# Patient Record
Sex: MALE | Race: WHITE | NOT HISPANIC OR LATINO | Employment: OTHER | RURAL
[De-identification: names, ages, dates, MRNs, and addresses within clinical notes are randomized per-mention and may not be internally consistent; named-entity substitution may affect disease eponyms.]

---

## 2017-04-05 ENCOUNTER — CONVERSION ENCOUNTER (OUTPATIENT)
Dept: FAMILY MEDICINE CLINIC | Facility: CLINIC | Age: 72
End: 2017-04-05

## 2017-06-08 ENCOUNTER — CONVERSION ENCOUNTER (OUTPATIENT)
Dept: FAMILY MEDICINE CLINIC | Facility: CLINIC | Age: 72
End: 2017-06-08

## 2018-09-06 ENCOUNTER — ON CAMPUS - OUTPATIENT (AMBULATORY)
Dept: URBAN - METROPOLITAN AREA HOSPITAL 2 | Facility: HOSPITAL | Age: 73
End: 2018-09-06

## 2018-09-06 VITALS
WEIGHT: 160.4 LBS | OXYGEN SATURATION: 95 % | TEMPERATURE: 97 F | HEART RATE: 73 BPM | DIASTOLIC BLOOD PRESSURE: 34 MMHG | SYSTOLIC BLOOD PRESSURE: 143 MMHG | OXYGEN SATURATION: 94 % | SYSTOLIC BLOOD PRESSURE: 81 MMHG | RESPIRATION RATE: 17 BRPM | SYSTOLIC BLOOD PRESSURE: 83 MMHG | HEART RATE: 69 BPM | HEART RATE: 65 BPM | OXYGEN SATURATION: 99 % | DIASTOLIC BLOOD PRESSURE: 82 MMHG | OXYGEN SATURATION: 97 % | SYSTOLIC BLOOD PRESSURE: 89 MMHG | DIASTOLIC BLOOD PRESSURE: 60 MMHG | DIASTOLIC BLOOD PRESSURE: 59 MMHG | DIASTOLIC BLOOD PRESSURE: 40 MMHG | SYSTOLIC BLOOD PRESSURE: 95 MMHG | OXYGEN SATURATION: 98 % | HEART RATE: 68 BPM | DIASTOLIC BLOOD PRESSURE: 58 MMHG | HEIGHT: 69 IN | SYSTOLIC BLOOD PRESSURE: 76 MMHG | RESPIRATION RATE: 16 BRPM | SYSTOLIC BLOOD PRESSURE: 127 MMHG | HEART RATE: 64 BPM

## 2018-09-06 DIAGNOSIS — K57.30 DIVERTICULOSIS OF LARGE INTESTINE WITHOUT PERFORATION OR ABS: ICD-10-CM

## 2018-09-06 DIAGNOSIS — Z86.010 PERSONAL HISTORY OF COLONIC POLYPS: ICD-10-CM

## 2018-09-06 DIAGNOSIS — K64.8 OTHER HEMORRHOIDS: ICD-10-CM

## 2018-09-06 PROCEDURE — 45378 DIAGNOSTIC COLONOSCOPY: CPT | Mod: PT | Performed by: INTERNAL MEDICINE

## 2018-11-12 ENCOUNTER — CONVERSION ENCOUNTER (OUTPATIENT)
Dept: FAMILY MEDICINE CLINIC | Facility: CLINIC | Age: 73
End: 2018-11-12

## 2019-06-04 VITALS
RESPIRATION RATE: 16 BRPM | SYSTOLIC BLOOD PRESSURE: 174 MMHG | HEART RATE: 69 BPM | RESPIRATION RATE: 18 BRPM | BODY MASS INDEX: 23.82 KG/M2 | DIASTOLIC BLOOD PRESSURE: 87 MMHG | OXYGEN SATURATION: 100 % | WEIGHT: 166 LBS | HEIGHT: 70 IN | HEART RATE: 71 BPM | SYSTOLIC BLOOD PRESSURE: 144 MMHG | WEIGHT: 166 LBS | DIASTOLIC BLOOD PRESSURE: 94 MMHG | SYSTOLIC BLOOD PRESSURE: 161 MMHG | WEIGHT: 166.4 LBS | DIASTOLIC BLOOD PRESSURE: 93 MMHG | HEART RATE: 77 BPM | BODY MASS INDEX: 24.16 KG/M2 | OXYGEN SATURATION: 97 % | BODY MASS INDEX: 24.16 KG/M2

## 2019-07-26 RX ORDER — SIMVASTATIN 40 MG
TABLET ORAL
Qty: 90 TABLET | Refills: 0 | Status: SHIPPED | OUTPATIENT
Start: 2019-07-26 | End: 2019-11-18 | Stop reason: SDUPTHER

## 2019-11-13 ENCOUNTER — TELEPHONE (OUTPATIENT)
Dept: FAMILY MEDICINE CLINIC | Facility: CLINIC | Age: 74
End: 2019-11-13

## 2019-11-13 NOTE — TELEPHONE ENCOUNTER
Proctosol HC 2.5% rectal cream is not covered by insurance. They are requesting a change to Proctosone cream hc, Proctomed  Cream hc,  Or Hydrocortison cream. Please advise.

## 2019-11-14 ENCOUNTER — OFFICE VISIT (OUTPATIENT)
Dept: PODIATRY | Facility: CLINIC | Age: 74
End: 2019-11-14

## 2019-11-14 VITALS
SYSTOLIC BLOOD PRESSURE: 135 MMHG | WEIGHT: 165.6 LBS | DIASTOLIC BLOOD PRESSURE: 77 MMHG | HEART RATE: 80 BPM | HEIGHT: 70 IN | BODY MASS INDEX: 23.71 KG/M2

## 2019-11-14 DIAGNOSIS — L60.3 ONYCHODYSTROPHY: Primary | ICD-10-CM

## 2019-11-14 PROBLEM — M76.61 ACHILLES TENDINITIS OF RIGHT LOWER EXTREMITY: Status: ACTIVE | Noted: 2017-04-05

## 2019-11-14 PROBLEM — M75.21 BICEPS TENDINITIS, RIGHT: Status: ACTIVE | Noted: 2017-04-05

## 2019-11-14 PROBLEM — M77.42 METATARSALGIA OF LEFT FOOT: Status: ACTIVE | Noted: 2017-06-08

## 2019-11-14 PROBLEM — E78.5 HYPERLIPIDEMIA: Status: ACTIVE | Noted: 2019-11-14

## 2019-11-14 PROBLEM — J30.9 ALLERGIC RHINITIS: Status: ACTIVE | Noted: 2019-11-14

## 2019-11-14 PROBLEM — F98.8 ATTENTION DEFICIT DISORDER (ADD) WITHOUT HYPERACTIVITY: Status: ACTIVE | Noted: 2019-11-14

## 2019-11-14 PROBLEM — M19.90 OSTEOARTHRITIS: Status: ACTIVE | Noted: 2019-11-14

## 2019-11-14 PROBLEM — J01.90 SINUSITIS, ACUTE: Status: ACTIVE | Noted: 2018-11-12

## 2019-11-14 PROCEDURE — 99213 OFFICE O/P EST LOW 20 MIN: CPT | Performed by: PODIATRIST

## 2019-11-14 RX ORDER — CHOLECALCIFEROL (VITAMIN D3) 125 MCG
5 CAPSULE ORAL NIGHTLY
COMMUNITY
Start: 2014-09-17

## 2019-11-14 RX ORDER — MAGNESIUM HYDROXIDE 400 MG/5ML
SUSPENSION, ORAL (FINAL DOSE FORM) ORAL
COMMUNITY
Start: 2014-09-17

## 2019-11-14 NOTE — PROGRESS NOTES
"11/14/2019  Foot and Ankle Surgery - Established Patient/Follow-up  Provider: Dr. Kirk Israel DPM  Location: [facility]    Subjective:  Beck Sears is a 74 y.o. male.     Chief Complaint   Patient presents with   • Left Foot - Nail Problem       HPI: Patient returns with new issue involving his left great toenail.  He has noticed long-standing discoloration and thickness.  He states that he did have an injury several years ago and has had progressive issues of nail dystrophy.  Most recently, he noticed mild redness and discomfort without known injury.  He states that he did perform Epson salt soaks and symptoms gradually improved.  He feels that this was approximately 1 month ago.  He has not had any symptoms recently and is asymptomatic today.  He has tried over-the-counter topicals for this issue without improvement.  No other issues today    Allergies   Allergen Reactions   • Bee Venom Anaphylaxis   • Penicillins GI Intolerance   • Sulfa Antibiotics Hives       Current Outpatient Medications on File Prior to Visit   Medication Sig Dispense Refill   • aspirin 81 MG tablet ASPIRIN 81 MG ORAL TABLET     • Glucosamine-Chondroit-Vit C-Mn (GLUCOSAMINE CHONDR 500 COMPLEX) capsule GLUCOSAMINE CHONDR 500 COMPLEX CAPS     • hydrocortisone (PROCTOZONE-HC) 2.5 % rectal cream Insert  into the rectum 2 (Two) Times a Day. 28.35 g 5   • melatonin ([facility] MELATONIN) 5 MG tablet tablet [facility] MELATONIN 5 MG TABS     • Multiple Vitamins-Minerals (MENS MULTI VITAMIN & MINERAL) tablet MENS MULTI VITAMIN & MINERAL TABS     • simvastatin (ZOCOR) 40 MG tablet TAKE 1 TABLET BY MOUTH DAILY 90 tablet 0     No current facility-administered medications on file prior to visit.        Objective   /77   Pulse 80   Ht 176.5 cm (69.5\")   Wt 75.1 kg (165 lb 9.6 oz)   BMI 24.10 kg/m²     Podiatry Exam       General Appearance:  well nourished; well hydrated; no acute distress  Mental Status Exam        Judgement and " Insight:  Intact       Orientation:  Oriented to time, place, and person  Cardiovascular (Left)       Dorsalis Pedis Pulse (Lt):   2/4       Posterior Tibialis Pulse (Lt):   2/4       Capillary Filling Time (Lt):  1-3 Seconds       Edema (Lt):  No Edema  Dermatological Exam       Temperature:  warm to warm       Skin Elasticity:  normal skin elasticity  Nails: Thickened, discolored, and dystrophic left hallux nail with longitudinal ridge.  No pain or signs of infection.  Other nails are normal in appearance  Neurological Exam (Left)       Paresthesia (Lt):  negative       Patella DTRs (Lt):  symmetric       Achilles DTRs (Lt):  symmetric       Tinel over Tarsal Tunnel(Lt):  negative  Monofilament Test (Lt):   intact        MusculoSkeletal Exam (Left)       Gait and Stance (Lt):  early heel to toe       ROM (Lt):   ankle and pedal joint range of motion is supple, nontender, and crepitus free       Muscle Strength (Lt):  symmetrical 5/5       Subluxed Digits (Lt):  no subluxation or laxity of joints       Dislocated Joints (Lt):  no dislocation of joints       Inflammed Joints (Lt):  no inflammation of joints       Medial Turbicle Calc (Lt):  no pain       Gastroc soleus equinus (Lt):  Inability to dorsiflex past neutral position both straight legged and bent knee       Post tibial tendon (Lt):  no soreness noted       Peroneal Tendon (Lt):  no soreness noted  Additional Musculoskelatal Findings  No obvious signs of trauma to the forefoot.  No tenderness with palpation to the metatarsophalangeal joint regions.    Assessment/Plan   Beck was seen today for nail problem.    Diagnoses and all orders for this visit:    Onychodystrophy      Patient presents with dystrophic changes involving the left great toenail.  He did have symptoms of inflammation approximately 1 month ago which have resolved.  Today, he is asymptomatic.  We did discuss treatment options.  I do feel that a majority of his changes are secondary to  previous injury.  I do not feel that oral or topical antifungals would be appropriate.  We did discuss nail removal with and without chemical matrixectomy.  He does not want to consider at this time.  We did discuss proper routine nail care and conservative treatments.  I have asked that he monitor closely and call with any additional issues or concerns.  I will see him as needed.    No orders of the defined types were placed in this encounter.         Note is dictated utilizing voice recognition software. Unfortunately this leads to occasional typographical errors. I apologize in advance if the situation occurs. If questions occur please do not hesitate to call our office.

## 2019-11-14 NOTE — PATIENT INSTRUCTIONS
Fungal Nail Infection  A fungal nail infection is a common infection of the toenails or fingernails. This condition affects toenails more often than fingernails. It often affects the great, or big, toes. More than one nail may be infected. The condition can be passed from person to person (is contagious).  What are the causes?  This condition is caused by a fungus. Several types of fungi can cause the infection. These fungi are common in moist and warm areas. If your hands or feet come into contact with the fungus, it may get into a crack in your fingernail or toenail and cause the infection.  What increases the risk?  The following factors may make you more likely to develop this condition:  · Being male.  · Being of older age.  · Living with someone who has the fungus.  · Walking barefoot in areas where the fungus thrives, such as showers or locker rooms.  · Wearing shoes and socks that cause your feet to sweat.  · Having a nail injury or a recent nail surgery.  · Having certain medical conditions, such as:  ? Athlete's foot.  ? Diabetes.  ? Psoriasis.  ? Poor circulation.  ? A weak body defense system (immune system).  What are the signs or symptoms?  Symptoms of this condition include:  · A pale spot on the nail.  · Thickening of the nail.  · A nail that becomes yellow or brown.  · A brittle or ragged nail edge.  · A crumbling nail.  · A nail that has lifted away from the nail bed.  How is this diagnosed?  This condition is diagnosed with a physical exam. Your health care provider may take a scraping or clipping from your nail to test for the fungus.  How is this treated?  Treatment is not needed for mild infections. If you have significant nail changes, treatment may include:  · Antifungal medicines taken by mouth (orally). You may need to take the medicine for several weeks or several months, and you may not see the results for a long time. These medicines can cause side effects. Ask your health care provider  what problems to watch for.  · Antifungal nail polish or nail cream. These may be used along with oral antifungal medicines.  · Laser treatment of the nail.  · Surgery to remove the nail. This may be needed for the most severe infections.  It can take a long time, usually up to a year, for the infection to go away. The infection may also come back.  Follow these instructions at home:  Medicines  · Take or apply over-the-counter and prescription medicines only as told by your health care provider.  · Ask your health care provider about using over-the-counter mentholated ointment on your nails.  Nail care  · Trim your nails often.  · Wash and dry your hands and feet every day.  · Keep your feet dry:  ? Wear absorbent socks, and change your socks frequently.  ? Wear shoes that allow air to circulate, such as sandals or canvas tennis shoes. Throw out old shoes.  · Do not use artificial nails.  · If you go to a nail salon, make sure you choose one that uses clean instruments.  · Use antifungal foot powder on your feet and in your shoes.  General instructions  · Do not share personal items, such as towels or nail clippers.  · Do not walk barefoot in shower rooms or locker rooms.  · Wear rubber gloves if you are working with your hands in wet areas.  · Keep all follow-up visits as told by your health care provider. This is important.  Contact a health care provider if:  Your infection is not getting better or it is getting worse after several months.  Summary  · A fungal nail infection is a common infection of the toenails or fingernails.  · Treatment is not needed for mild infections. If you have significant nail changes, treatment may include taking medicine orally and applying medicine to your nails.  · It can take a long time, usually up to a year, for the infection to go away. The infection may also come back.  · Take or apply over-the-counter and prescription medicines only as told by your health care  provider.  · Follow instructions for taking care of your nails to help prevent infection from coming back or spreading.  This information is not intended to replace advice given to you by your health care provider. Make sure you discuss any questions you have with your health care provider.  Document Released: 12/15/2001 Document Revised: 05/24/2019 Document Reviewed: 05/24/2019  [employer] Interactive Patient Education © 2019 [employer]

## 2019-11-18 RX ORDER — SIMVASTATIN 40 MG
TABLET ORAL
Qty: 90 TABLET | Refills: 0 | Status: SHIPPED | OUTPATIENT
Start: 2019-11-18 | End: 2020-02-07

## 2019-12-09 ENCOUNTER — TELEPHONE (OUTPATIENT)
Dept: FAMILY MEDICINE CLINIC | Facility: CLINIC | Age: 74
End: 2019-12-09

## 2020-02-07 RX ORDER — SIMVASTATIN 40 MG
TABLET ORAL
Qty: 90 TABLET | Refills: 0 | Status: SHIPPED | OUTPATIENT
Start: 2020-02-07 | End: 2020-05-10

## 2020-05-10 RX ORDER — SIMVASTATIN 40 MG
TABLET ORAL
Qty: 90 TABLET | Refills: 0 | Status: SHIPPED | OUTPATIENT
Start: 2020-05-10 | End: 2020-08-06 | Stop reason: SDUPTHER

## 2020-08-05 RX ORDER — SIMVASTATIN 40 MG
TABLET ORAL
Qty: 90 TABLET | Refills: 0 | OUTPATIENT
Start: 2020-08-05

## 2020-08-06 DIAGNOSIS — E78.5 HYPERLIPIDEMIA, UNSPECIFIED HYPERLIPIDEMIA TYPE: Primary | ICD-10-CM

## 2020-08-06 DIAGNOSIS — Z12.5 ENCOUNTER FOR SCREENING FOR MALIGNANT NEOPLASM OF PROSTATE: ICD-10-CM

## 2020-08-06 RX ORDER — SIMVASTATIN 40 MG
40 TABLET ORAL DAILY
Qty: 30 TABLET | Refills: 0 | Status: SHIPPED | OUTPATIENT
Start: 2020-08-06 | End: 2020-09-08

## 2020-08-06 RX ORDER — SIMVASTATIN 40 MG
40 TABLET ORAL DAILY
Qty: 30 TABLET | Refills: 0 | Status: SHIPPED | OUTPATIENT
Start: 2020-08-06 | End: 2020-08-06

## 2020-08-06 NOTE — TELEPHONE ENCOUNTER
Patient has appt 8/27 he is wanting to get labs done before. He will be out of med. Before then please send in to  [facility]  And call him on when to get labs  [phone]

## 2020-08-14 LAB
ALBUMIN SERPL-MCNC: 4.3 G/DL (ref 3.7–4.7)
ALBUMIN/GLOB SERPL: 2.2 {RATIO} (ref 1.2–2.2)
ALP SERPL-CCNC: 56 IU/L (ref 39–117)
ALT SERPL-CCNC: 20 IU/L (ref 0–44)
AST SERPL-CCNC: 20 IU/L (ref 0–40)
BILIRUB SERPL-MCNC: 0.5 MG/DL (ref 0–1.2)
BUN SERPL-MCNC: 19 MG/DL (ref 8–27)
BUN/CREAT SERPL: 18 (ref 10–24)
CALCIUM SERPL-MCNC: 9.5 MG/DL (ref 8.6–10.2)
CHLORIDE SERPL-SCNC: 103 MMOL/L (ref 96–106)
CHOLEST SERPL-MCNC: 179 MG/DL (ref 100–199)
CO2 SERPL-SCNC: 29 MMOL/L (ref 20–29)
CREAT SERPL-MCNC: 1.07 MG/DL (ref 0.76–1.27)
GLOBULIN SER CALC-MCNC: 2 G/DL (ref 1.5–4.5)
GLUCOSE SERPL-MCNC: 97 MG/DL (ref 65–99)
HDLC SERPL-MCNC: 61 MG/DL
LDLC SERPL CALC-MCNC: 86 MG/DL (ref 0–99)
POTASSIUM SERPL-SCNC: 4.6 MMOL/L (ref 3.5–5.2)
PROT SERPL-MCNC: 6.3 G/DL (ref 6–8.5)
SODIUM SERPL-SCNC: 140 MMOL/L (ref 134–144)
TRIGL SERPL-MCNC: 158 MG/DL (ref 0–149)
VLDLC SERPL CALC-MCNC: 32 MG/DL (ref 5–40)

## 2020-08-27 ENCOUNTER — OFFICE VISIT (OUTPATIENT)
Dept: FAMILY MEDICINE CLINIC | Facility: CLINIC | Age: 75
End: 2020-08-27

## 2020-08-27 VITALS
OXYGEN SATURATION: 97 % | WEIGHT: 164 LBS | SYSTOLIC BLOOD PRESSURE: 134 MMHG | HEART RATE: 78 BPM | TEMPERATURE: 97.7 F | HEIGHT: 70 IN | BODY MASS INDEX: 23.48 KG/M2 | RESPIRATION RATE: 16 BRPM | DIASTOLIC BLOOD PRESSURE: 70 MMHG

## 2020-08-27 DIAGNOSIS — E78.2 MIXED HYPERLIPIDEMIA: Chronic | ICD-10-CM

## 2020-08-27 DIAGNOSIS — R35.1 BENIGN PROSTATIC HYPERPLASIA WITH NOCTURIA: Primary | ICD-10-CM

## 2020-08-27 DIAGNOSIS — N40.1 BENIGN PROSTATIC HYPERPLASIA WITH NOCTURIA: Primary | ICD-10-CM

## 2020-08-27 DIAGNOSIS — J30.1 SEASONAL ALLERGIC RHINITIS DUE TO POLLEN: Chronic | ICD-10-CM

## 2020-08-27 PROCEDURE — 99213 OFFICE O/P EST LOW 20 MIN: CPT | Performed by: FAMILY MEDICINE

## 2020-08-27 RX ORDER — TRIAMCINOLONE ACETONIDE 1 MG/G
CREAM TOPICAL
COMMUNITY
Start: 2020-08-11

## 2020-08-27 RX ORDER — TAMSULOSIN HYDROCHLORIDE 0.4 MG/1
1 CAPSULE ORAL DAILY
Qty: 90 CAPSULE | Refills: 3 | Status: SHIPPED | OUTPATIENT
Start: 2020-08-27 | End: 2021-07-15 | Stop reason: SDUPTHER

## 2020-08-27 NOTE — PROGRESS NOTES
"Patient presents for follow-up of hyperlipidemia and refill of medications.  He is stable and not having any adverse effects to medications.  Specifically he denies muscle aches or nausea.  He is also complaining of increasing nocturia.  He had tried an alpha-blocker in the past but did not like the way it made him feel.  However, his symptoms are worsening and he would like to restart.    Past Medical History:   Diagnosis Date   • Hyperlipidemia 11/14/2019     Past Surgical History:   Procedure Laterality Date   • BACK SURGERY     • CATARACT EXTRACTION       Family History   Problem Relation Age of Onset   • Cancer Mother    • Cancer Father    • Heart disease Father    • Hypertension Father      Social History     Tobacco Use   • Smoking status: Never Smoker   • Smokeless tobacco: Never Used   Substance Use Topics   • Alcohol use: No     Frequency: Never       PHQ-2 Depression Screening  0  PHQ-9 Depression Screening  0    Review of Systems   Constitutional: Negative for chills and fatigue.   Respiratory: Negative for cough and shortness of breath.    Cardiovascular: Negative for chest pain and palpitations.   Genitourinary: Positive for nocturia.   Musculoskeletal: Negative for arthralgias, back pain and myalgias.   Skin: Negative for rash.   Neurological: Negative for dizziness and headache.     Vitals:    08/27/20 1530 08/27/20 1551   BP: 167/85 134/70   BP Location: Left arm    Patient Position: Sitting    Cuff Size: Adult    Pulse: 78    Resp: 16    Temp: 97.7 °F (36.5 °C)    TempSrc: Temporal    SpO2: 97%    Weight: 74.4 kg (164 lb)    Height: 176.5 cm (69.5\")      Body mass index is 23.87 kg/m².  Physical Exam   Constitutional: He is oriented to person, place, and time. He appears well-developed and well-nourished. No distress.   Cardiovascular: Normal rate, regular rhythm and normal heart sounds.   No murmur heard.  Pulmonary/Chest: Effort normal and breath sounds normal. He has no wheezes. He has no rales. "   Abdominal: Soft. Bowel sounds are normal. He exhibits no distension.   Musculoskeletal: Normal range of motion.   Neurological: He is alert and oriented to person, place, and time.   Skin: Skin is warm and dry.   Psychiatric: He has a normal mood and affect. His behavior is normal.     No visits with results within 7 Day(s) from this visit.   Latest known visit with results is:   Refill on 08/06/2020   Component Date Value Ref Range Status   • Glucose 08/13/2020 97  65 - 99 mg/dL Final   • BUN 08/13/2020 19  8 - 27 mg/dL Final   • Creatinine 08/13/2020 1.07  0.76 - 1.27 mg/dL Final   • eGFR Non  Am 08/13/2020 68  >59 mL/min/1.73 Final   • eGFR African Am 08/13/2020 79  >59 mL/min/1.73 Final   • BUN/Creatinine Ratio 08/13/2020 18  10 - 24 Final   • Sodium 08/13/2020 140  134 - 144 mmol/L Final   • Potassium 08/13/2020 4.6  3.5 - 5.2 mmol/L Final   • Chloride 08/13/2020 103  96 - 106 mmol/L Final   • Total CO2 08/13/2020 29  20 - 29 mmol/L Final   • Calcium 08/13/2020 9.5  8.6 - 10.2 mg/dL Final   • Total Protein 08/13/2020 6.3  6.0 - 8.5 g/dL Final   • Albumin 08/13/2020 4.3  3.7 - 4.7 g/dL Final   • Globulin 08/13/2020 2.0  1.5 - 4.5 g/dL Final   • A/G Ratio 08/13/2020 2.2  1.2 - 2.2 Final   • Total Bilirubin 08/13/2020 0.5  0.0 - 1.2 mg/dL Final   • Alkaline Phosphatase 08/13/2020 56  39 - 117 IU/L Final   • AST (SGOT) 08/13/2020 20  0 - 40 IU/L Final   • ALT (SGPT) 08/13/2020 20  0 - 44 IU/L Final   • Total Cholesterol 08/13/2020 179  100 - 199 mg/dL Final   • Triglycerides 08/13/2020 158* 0 - 149 mg/dL Final   • HDL Cholesterol 08/13/2020 61  >39 mg/dL Final   • VLDL Cholesterol 08/13/2020 32  5 - 40 mg/dL Final   • LDL Cholesterol  08/13/2020 86  0 - 99 mg/dL Final       Diagnoses and all orders for this visit:    1. Benign prostatic hyperplasia with nocturia (Primary)  Assessment & Plan:  Restart tamsulosin.      2. Mixed hyperlipidemia  Assessment & Plan:  Recheck blood work and call with results.         3. Seasonal allergic rhinitis due to pollen  Assessment & Plan:  Seasonal. Controlled with over-the-counter medications.       Other orders  -     tamsulosin (FLOMAX) 0.4 MG capsule 24 hr capsule; Take 1 capsule by mouth Daily.  Dispense: 90 capsule; Refill: 3

## 2020-08-28 PROBLEM — J30.9 ALLERGIC RHINITIS: Chronic | Status: ACTIVE | Noted: 2019-11-14

## 2020-08-28 PROBLEM — E78.5 HYPERLIPIDEMIA: Chronic | Status: ACTIVE | Noted: 2019-11-14

## 2020-08-28 PROBLEM — M19.90 OSTEOARTHRITIS: Chronic | Status: ACTIVE | Noted: 2019-11-14

## 2020-08-28 PROBLEM — J01.90 SINUSITIS, ACUTE: Status: RESOLVED | Noted: 2018-11-12 | Resolved: 2020-08-28

## 2020-09-08 RX ORDER — SIMVASTATIN 40 MG
40 TABLET ORAL DAILY
Qty: 90 TABLET | Refills: 1 | Status: SHIPPED | OUTPATIENT
Start: 2020-09-08 | End: 2021-03-03

## 2020-10-05 ENCOUNTER — TELEPHONE (OUTPATIENT)
Dept: FAMILY MEDICINE CLINIC | Facility: CLINIC | Age: 75
End: 2020-10-05

## 2021-02-05 RX ORDER — HYDROCORTISONE 25 MG/G
CREAM TOPICAL
Qty: 30 G | Refills: 1 | Status: SHIPPED | OUTPATIENT
Start: 2021-02-05 | End: 2021-10-19 | Stop reason: SDUPTHER

## 2021-03-03 RX ORDER — SIMVASTATIN 40 MG
40 TABLET ORAL DAILY
Qty: 90 TABLET | Refills: 1 | Status: SHIPPED | OUTPATIENT
Start: 2021-03-03 | End: 2021-06-10

## 2021-06-10 RX ORDER — SIMVASTATIN 40 MG
40 TABLET ORAL DAILY
Qty: 90 TABLET | Refills: 1 | Status: SHIPPED | OUTPATIENT
Start: 2021-06-10 | End: 2022-03-22

## 2021-06-16 ENCOUNTER — HOSPITAL ENCOUNTER (EMERGENCY)
Facility: HOSPITAL | Age: 76
Discharge: HOME | End: 2021-06-16
Admitting: EMERGENCY MEDICINE

## 2021-06-16 ENCOUNTER — APPOINTMENT (OUTPATIENT)
Dept: CT IMAGING | Facility: HOSPITAL | Age: 76
End: 2021-06-16

## 2021-06-16 ENCOUNTER — APPOINTMENT (OUTPATIENT)
Dept: MRI IMAGING | Facility: HOSPITAL | Age: 76
End: 2021-06-16

## 2021-06-16 VITALS
SYSTOLIC BLOOD PRESSURE: 134 MMHG | DIASTOLIC BLOOD PRESSURE: 79 MMHG | OXYGEN SATURATION: 96 % | HEART RATE: 62 BPM | TEMPERATURE: 97.6 F | HEIGHT: 69 IN | WEIGHT: 162.7 LBS | BODY MASS INDEX: 24.1 KG/M2 | RESPIRATION RATE: 16 BRPM

## 2021-06-16 DIAGNOSIS — G89.29 CHRONIC PAIN OF RIGHT LOWER EXTREMITY: ICD-10-CM

## 2021-06-16 DIAGNOSIS — R29.898 WEAKNESS OF RIGHT LOWER EXTREMITY: Primary | ICD-10-CM

## 2021-06-16 DIAGNOSIS — M79.604 CHRONIC PAIN OF RIGHT LOWER EXTREMITY: ICD-10-CM

## 2021-06-16 LAB
ALBUMIN SERPL-MCNC: 4 G/DL (ref 3.5–5.2)
ALBUMIN/GLOB SERPL: 1.6 G/DL
ALP SERPL-CCNC: 58 U/L (ref 39–117)
ALT SERPL W P-5'-P-CCNC: 21 U/L (ref 1–41)
ANION GAP SERPL CALCULATED.3IONS-SCNC: 9 MMOL/L (ref 5–15)
AST SERPL-CCNC: 20 U/L (ref 1–40)
BASOPHILS # BLD AUTO: 0.1 10*3/MM3 (ref 0–0.2)
BASOPHILS NFR BLD AUTO: 0.7 % (ref 0–1.5)
BILIRUB SERPL-MCNC: 0.4 MG/DL (ref 0–1.2)
BUN SERPL-MCNC: 21 MG/DL (ref 8–23)
BUN/CREAT SERPL: 21 (ref 7–25)
CALCIUM SPEC-SCNC: 9.4 MG/DL (ref 8.6–10.5)
CHLORIDE SERPL-SCNC: 106 MMOL/L (ref 98–107)
CO2 SERPL-SCNC: 25 MMOL/L (ref 22–29)
CREAT SERPL-MCNC: 1 MG/DL (ref 0.76–1.27)
DEPRECATED RDW RBC AUTO: 44.6 FL (ref 37–54)
EOSINOPHIL # BLD AUTO: 0.3 10*3/MM3 (ref 0–0.4)
EOSINOPHIL NFR BLD AUTO: 3.7 % (ref 0.3–6.2)
ERYTHROCYTE [DISTWIDTH] IN BLOOD BY AUTOMATED COUNT: 15.3 % (ref 12.3–15.4)
GFR SERPL CREATININE-BSD FRML MDRD: 73 ML/MIN/1.73
GLOBULIN UR ELPH-MCNC: 2.5 GM/DL
GLUCOSE SERPL-MCNC: 107 MG/DL (ref 65–99)
HCT VFR BLD AUTO: 43.7 % (ref 37.5–51)
HGB BLD-MCNC: 14.8 G/DL (ref 13–17.7)
LYMPHOCYTES # BLD AUTO: 2.2 10*3/MM3 (ref 0.7–3.1)
LYMPHOCYTES NFR BLD AUTO: 29.6 % (ref 19.6–45.3)
MCH RBC QN AUTO: 28.7 PG (ref 26.6–33)
MCHC RBC AUTO-ENTMCNC: 33.9 G/DL (ref 31.5–35.7)
MCV RBC AUTO: 84.8 FL (ref 79–97)
MONOCYTES # BLD AUTO: 0.8 10*3/MM3 (ref 0.1–0.9)
MONOCYTES NFR BLD AUTO: 11.3 % (ref 5–12)
NEUTROPHILS NFR BLD AUTO: 4.1 10*3/MM3 (ref 1.7–7)
NEUTROPHILS NFR BLD AUTO: 54.7 % (ref 42.7–76)
NRBC BLD AUTO-RTO: 0.1 /100 WBC (ref 0–0.2)
PLATELET # BLD AUTO: 216 10*3/MM3 (ref 140–450)
PMV BLD AUTO: 6.6 FL (ref 6–12)
POTASSIUM SERPL-SCNC: 4.4 MMOL/L (ref 3.5–5.2)
PROT SERPL-MCNC: 6.5 G/DL (ref 6–8.5)
RBC # BLD AUTO: 5.15 10*6/MM3 (ref 4.14–5.8)
SODIUM SERPL-SCNC: 140 MMOL/L (ref 136–145)
WBC # BLD AUTO: 7.5 10*3/MM3 (ref 3.4–10.8)

## 2021-06-16 PROCEDURE — 85025 COMPLETE CBC W/AUTO DIFF WBC: CPT | Performed by: NURSE PRACTITIONER

## 2021-06-16 PROCEDURE — 70553 MRI BRAIN STEM W/O & W/DYE: CPT

## 2021-06-16 PROCEDURE — 25010000002 GADOTERIDOL PER 1 ML: Performed by: NURSE PRACTITIONER

## 2021-06-16 PROCEDURE — A9579 GAD-BASE MR CONTRAST NOS,1ML: HCPCS | Performed by: NURSE PRACTITIONER

## 2021-06-16 PROCEDURE — 80053 COMPREHEN METABOLIC PANEL: CPT | Performed by: NURSE PRACTITIONER

## 2021-06-16 PROCEDURE — 70450 CT HEAD/BRAIN W/O DYE: CPT

## 2021-06-16 PROCEDURE — 99284 EMERGENCY DEPT VISIT MOD MDM: CPT

## 2021-06-16 RX ORDER — SODIUM CHLORIDE 0.9 % (FLUSH) 0.9 %
10 SYRINGE (ML) INJECTION AS NEEDED
Status: DISCONTINUED | OUTPATIENT
Start: 2021-06-16 | End: 2021-06-16 | Stop reason: HOSPADM

## 2021-06-16 RX ORDER — IBUPROFEN 600 MG/1
600 TABLET ORAL EVERY 6 HOURS PRN
Qty: 30 TABLET | Refills: 0 | Status: SHIPPED | OUTPATIENT
Start: 2021-06-16

## 2021-06-16 RX ADMIN — GADOTERIDOL 15 ML: 279.3 INJECTION, SOLUTION INTRAVENOUS at 12:12

## 2021-06-16 NOTE — ED TRIAGE NOTES
Pt reports on Monday when pt was out for a walk he noticed weakness and pain with ambulation that caused him to limp and use a cane to RLE. Now with continued pain and weakness. Pt denies any numbness/tingling to any other extremities.

## 2021-06-16 NOTE — DISCHARGE INSTRUCTIONS
You may take ibuprofen 3 times daily as needed for pain.  Make sure you eat with the ibuprofen when you take it.  Call today to schedule a follow-up appointment with your primary care provider.  Return to the emergency department for any new or worsening symptoms.

## 2021-06-16 NOTE — ED PROVIDER NOTES
Subjective   History:    76-year-old male presents the emergency department with complaints of perceived right lower extremity weakness that has been present since Monday. Patient reports that he has had issues with his right leg in the past and has been trying to do exercises to strengthen the right leg and sees a chiropractor for treatment of right leg weakness. However on Monday he worked and tried to get out of his car and had difficulty moving the right leg and has been using a cane. Patient decided to come in today for evaluation of possible stroke. Patient has a past medical history only of hyperlipidemia and he takes simvastatin for this. He sees a primary care provider in [place] has an appointment with them coming up in August. Patient denies any cognitive change, weakness in the right upper extremity, slurring of the speech, does report that he has slight numbness in his right jaw.              Review of Systems   Constitutional: Negative for appetite change, chills, fatigue and fever.   HENT: Negative for congestion, facial swelling, sinus pain and sore throat.    Eyes: Negative for pain and visual disturbance.   Respiratory: Negative for cough, chest tightness and shortness of breath.    Cardiovascular: Negative for chest pain and palpitations.   Gastrointestinal: Negative for constipation, diarrhea, nausea and vomiting.   Genitourinary: Negative for dysuria, flank pain, frequency and urgency.   Musculoskeletal: Negative for arthralgias, joint swelling and neck pain.   Skin: Negative for color change and rash.   Neurological: Positive for weakness and numbness. Negative for dizziness, seizures, syncope, light-headedness and headaches.       Past Medical History:   Diagnosis Date   • Hyperlipidemia 11/14/2019       Allergies   Allergen Reactions   • Bee Venom Anaphylaxis   • Penicillins GI Intolerance   • Sulfa Antibiotics Hives       Past Surgical History:   Procedure Laterality Date   • BACK  SURGERY     • CATARACT EXTRACTION         Family History   Problem Relation Age of Onset   • Cancer Mother    • Cancer Father    • Heart disease Father    • Hypertension Father        Social History     Socioeconomic History   • Marital status:      Spouse name: Not on file   • Number of children: Not on file   • Years of education: Not on file   • Highest education level: Not on file   Tobacco Use   • Smoking status: Never Smoker   • Smokeless tobacco: Never Used   Vaping Use   • Vaping Use: Never used   Substance and Sexual Activity   • Alcohol use: No   • Drug use: No   • Sexual activity: Defer           Objective   Physical Exam  Constitutional:       General: He is not in acute distress.     Appearance: Normal appearance. He is normal weight. He is not ill-appearing.   HENT:      Head: Normocephalic and atraumatic.   Eyes:      General: No visual field deficit.     Extraocular Movements: Extraocular movements intact.      Conjunctiva/sclera: Conjunctivae normal.      Pupils: Pupils are equal, round, and reactive to light.   Neck:      Vascular: No carotid bruit.   Cardiovascular:      Rate and Rhythm: Normal rate and regular rhythm.      Pulses: Normal pulses.      Heart sounds: Normal heart sounds.   Pulmonary:      Effort: Pulmonary effort is normal.      Breath sounds: Normal breath sounds.   Abdominal:      General: Abdomen is flat. Bowel sounds are normal. There is no distension.      Palpations: Abdomen is soft.      Tenderness: There is no abdominal tenderness. There is no guarding.   Musculoskeletal:         General: No swelling, tenderness, deformity or signs of injury. Normal range of motion.      Cervical back: Normal range of motion and neck supple. No tenderness.      Right lower leg: No edema.      Left lower leg: No edema.   Lymphadenopathy:      Cervical: No cervical adenopathy.   Skin:     General: Skin is warm and dry.      Capillary Refill: Capillary refill takes less than 2 seconds.    Neurological:      General: No focal deficit present.      Mental Status: He is alert and oriented to person, place, and time.      GCS: GCS eye subscore is 4. GCS verbal subscore is 5. GCS motor subscore is 6.      Cranial Nerves: Cranial nerves are intact. No cranial nerve deficit, dysarthria or facial asymmetry.      Sensory: Sensation is intact.      Motor: Motor function is intact. No weakness, tremor, atrophy, abnormal muscle tone or pronator drift.      Coordination: Finger-Nose-Finger Test and Heel to Shin Test normal.      Gait: Gait abnormal.      Comments: Patient reports weakness of the right lower extremity but there is no weakness noted on exam   Psychiatric:         Attention and Perception: Attention normal.         Mood and Affect: Mood is anxious.         Speech: Speech normal.         Behavior: Behavior normal.         Thought Content: Thought content normal.         Cognition and Memory: Cognition and memory normal.         Judgment: Judgment normal.         Procedures           ED Course      Medications   sodium chloride 0.9 % flush 10 mL (has no administration in time range)   gadoteridol (PROHANCE) injection 15 mL (15 mL Intravenous Given 6/16/21 1212)     Labs Reviewed   COMPREHENSIVE METABOLIC PANEL - Abnormal; Notable for the following components:       Result Value    Glucose 107 (*)     All other components within normal limits    Narrative:     GFR Normal >60  Chronic Kidney Disease <60  Kidney Failure <15     CBC WITH AUTO DIFFERENTIAL - Normal   CBC AND DIFFERENTIAL    Narrative:     The following orders were created for panel order CBC & Differential.  Procedure                               Abnormality         Status                     ---------                               -----------         ------                     CBC Auto Differential[[id number]]        Normal              Final result                 Please view results for these tests on the individual orders.     MRI  Brain With & Without Contrast   Final Result       1. No acute intracranial findings. No evidence of recent infarct.   2. Mild atrophy and mild to moderate chronic microvascular disease.           Electronically Signed By-Parvin Mitchell MD On:6/16/2021 12:40 PM   This report was finalized on [id number] by  Parvin Mitchell MD.      CT Head Without Contrast   Final Result       1. No acute intracranial findings.   2. Mild chronic microvascular disease.   3. If the patient strokelike symptoms, consider correlation to MRI brain   stroke protocol       Electronically Signed By-Parvin Mitchell MD On:6/16/2021 10:56 AM   This report was finalized on [id number] by  Parvin Mitchell MD.                                               MDM  Number of Diagnoses or Management Options  Chronic pain of right lower extremity  Weakness of right lower extremity  Diagnosis management comments: I examined the patient using the appropriate personal protective equipment.      DISPOSITION:   Chart Review:  Comorbidity:  has a past medical history of Hyperlipidemia (11/14/2019).    Labs: Glucose 107, BUN 21, creatinine 1.0, sodium 140, potassium 4.4, WBC 7.5, hemoglobin 14.8    Imaging: Was interpreted by physician and reviewed by myself:  CT Head Without Contrast    Result Date: 6/16/2021   1. No acute intracranial findings. 2. Mild chronic microvascular disease. 3. If the patient strokelike symptoms, consider correlation to MRI brain stroke protocol  Electronically Signed By-Parvin Mitchell MD On:6/16/2021 10:56 AM This report was finalized on [id number] by  Parvin Mitchell MD.    MRI Brain With & Without Contrast    Result Date: 6/16/2021   1. No acute intracranial findings. No evidence of recent infarct. 2. Mild atrophy and mild to moderate chronic microvascular disease.   Electronically Signed By-Parvin Mitchell MD On:6/16/2021 12:40 PM This report was finalized on [id number] by  Parvin Mitchell  MD.      Disposition/Treatment:    76-year-old male presented to the emergency department today for evaluation of perceived right lower extremity weakness and very mild numbness of the right jaw.  Patient was concerned could possibly be having a stroke.  Only past medical history is hyperlipidemia.  Patient does not smoke.  Patient reports he does have a history of low back pain that has caused weakness in the right lower extremity previously and he has seen a chiropractor for this in the past.  He is walking with a cane due to the problem with his right leg.  CT of the head was performed and was found to be negative for acute changes.  MRI of the brain with and without was also performed and there was found to be no evidence of past or present stroke.  Labs were obtained and are as noted above and were found to be unremarkable.  Discussed findings with patient and his wife.  When discussing the results of the MRI the patient does reveal that he has pain in the right lower extremity that is causing his issue with his gait.  He has had this pain before related to the low back.  Discussed that he should make an appointment with his primary care provider to discuss this pain for further work-up.  It is important to note that the patient did not have any weakness in the right lower extremity with examination and reflexes and pulses were equal and strong bilaterally.  The patient reports perceived numbness of the face, when touching on both sides he does not report that the right side of the face feels numb when compared to the left.  Patient will be discharged home in stable condition to follow-up with his primary care provider with a prescription for ibuprofen to treat his musculoskeletal pain in the right lower extremity.  Will return to the emergency for any new or worsening symptoms.  Patient and wife are in agreement with plan.       Amount and/or Complexity of Data Reviewed  Clinical lab tests: reviewed  Tests in  the radiology section of CPT®: reviewed        Final diagnoses:   Weakness of right lower extremity   Chronic pain of right lower extremity       ED Disposition  ED Disposition     ED Disposition Condition Comment    Discharge Stable           Lyell, Reggie Duane, MD  [address] [place] [address] [address] [place] [address] [phone]    Schedule an appointment as soon as possible for a visit in 1 week           Medication List      New Prescriptions    ibuprofen 600 MG tablet  Commonly known as: ADVIL,MOTRIN  Take 1 tablet by mouth Every 6 (Six) Hours As Needed for Mild Pain .           Where to Get Your Medications      These medications were sent to [id number] - [place], [place] - [address] - [phone] PH - [phone] FX  [address] [place] [address]    Phone: [phone]   · ibuprofen 600 MG tablet          Charity Harvey, APRN  06/16/21 2459

## 2021-06-17 ENCOUNTER — OFFICE VISIT (OUTPATIENT)
Dept: FAMILY MEDICINE CLINIC | Facility: CLINIC | Age: 76
End: 2021-06-17

## 2021-06-17 VITALS
HEART RATE: 79 BPM | WEIGHT: 164.2 LBS | RESPIRATION RATE: 16 BRPM | SYSTOLIC BLOOD PRESSURE: 156 MMHG | DIASTOLIC BLOOD PRESSURE: 74 MMHG | BODY MASS INDEX: 24.32 KG/M2 | HEIGHT: 69 IN | OXYGEN SATURATION: 97 % | TEMPERATURE: 98.2 F

## 2021-06-17 DIAGNOSIS — M76.61 ACHILLES TENDINITIS OF RIGHT LOWER EXTREMITY: Primary | ICD-10-CM

## 2021-06-17 DIAGNOSIS — M15.9 PRIMARY OSTEOARTHRITIS INVOLVING MULTIPLE JOINTS: Chronic | ICD-10-CM

## 2021-06-17 PROCEDURE — 99213 OFFICE O/P EST LOW 20 MIN: CPT | Performed by: FAMILY MEDICINE

## 2021-06-17 NOTE — PROGRESS NOTES
"Chief Complaint  Follow-up    Subjective         Bekc Sears presents to [facility] for   Emergency room follow-up after being seen for tingling and numbness of the lower extremities.  He had a work-up including negative evaluation for CVA.  He has no focal abnormality.  He denies any back pain.  He has longstanding Achilles tendinitis and his numbness and tingling is in the lower aspect of the right leg around the Achilles.  He has been quite active, climbing ladders and working.    See review of systems below. Pertinent past medical history includes hyperlipidemia.       PHQ-2 Total Score:    PHQ-9 Total Score:      Review of Systems   Constitutional: Negative for chills, fatigue and fever.   Respiratory: Negative for cough and shortness of breath.    Cardiovascular: Negative for chest pain and palpitations.   Gastrointestinal: Negative for constipation, diarrhea, nausea and vomiting.   Musculoskeletal: Negative for back pain and neck pain.   Skin: Negative for rash.   Neurological: Positive for numbness. Negative for dizziness and headaches.       Objective   Vital Signs:   /74 (BP Location: Right arm)   Pulse 79   Temp 98.2 °F (36.8 °C)   Resp 16   Ht 175.3 cm (69\")   Wt 74.5 kg (164 lb 3.2 oz)   SpO2 97%   BMI 24.25 kg/m²     Physical Exam  Constitutional:       General: He is not in acute distress.     Appearance: He is well-developed.   Cardiovascular:      Rate and Rhythm: Normal rate and regular rhythm.      Heart sounds: Normal heart sounds. No murmur heard.     Pulmonary:      Effort: Pulmonary effort is normal.      Breath sounds: Normal breath sounds. No wheezing or rales.   Abdominal:      General: Bowel sounds are normal. There is no distension.      Palpations: Abdomen is soft.   Musculoskeletal:         General: Normal range of motion.   Skin:     General: Skin is warm and dry.   Neurological:      Mental Status: He is alert and oriented to person, place, and time. "   Psychiatric:         Behavior: Behavior normal.        Result Review :                   Diagnoses and all orders for this visit:    1. Achilles tendinitis of right lower extremity (Primary)    2. Primary osteoarthritis involving multiple joints          Follow Up   No follow-ups on file.  Patient was given instructions and counseling regarding his condition or for health maintenance advice. Please see specific information pulled into the AVS if appropriate.     Reggie Duane Lyell, MD  6/17/2021  This note was electronically signed.

## 2021-06-17 NOTE — ASSESSMENT & PLAN NOTE
I think most of his symptoms are related to arthritis and tendinitis.  Continue current medications and rest for the next week or so.  Reevaluate if no better.

## 2021-07-15 ENCOUNTER — OFFICE VISIT (OUTPATIENT)
Dept: FAMILY MEDICINE CLINIC | Facility: CLINIC | Age: 76
End: 2021-07-15

## 2021-07-15 VITALS
RESPIRATION RATE: 16 BRPM | TEMPERATURE: 98.1 F | DIASTOLIC BLOOD PRESSURE: 81 MMHG | HEART RATE: 84 BPM | OXYGEN SATURATION: 96 % | SYSTOLIC BLOOD PRESSURE: 168 MMHG | BODY MASS INDEX: 24.32 KG/M2 | HEIGHT: 69 IN | WEIGHT: 164.2 LBS

## 2021-07-15 DIAGNOSIS — F51.01 PRIMARY INSOMNIA: ICD-10-CM

## 2021-07-15 DIAGNOSIS — M51.06 INTERVERTEBRAL DISC DISORDER OF LUMBAR REGION WITH MYELOPATHY: ICD-10-CM

## 2021-07-15 DIAGNOSIS — Z00.00 MEDICARE ANNUAL WELLNESS VISIT, SUBSEQUENT: Primary | ICD-10-CM

## 2021-07-15 DIAGNOSIS — J30.1 SEASONAL ALLERGIC RHINITIS DUE TO POLLEN: Chronic | ICD-10-CM

## 2021-07-15 DIAGNOSIS — E78.2 MIXED HYPERLIPIDEMIA: Chronic | ICD-10-CM

## 2021-07-15 DIAGNOSIS — I10 ESSENTIAL HYPERTENSION: ICD-10-CM

## 2021-07-15 DIAGNOSIS — M15.9 PRIMARY OSTEOARTHRITIS INVOLVING MULTIPLE JOINTS: Chronic | ICD-10-CM

## 2021-07-15 DIAGNOSIS — R35.1 NOCTURIA: ICD-10-CM

## 2021-07-15 DIAGNOSIS — Z12.5 SCREENING PSA (PROSTATE SPECIFIC ANTIGEN): ICD-10-CM

## 2021-07-15 DIAGNOSIS — R35.1 BENIGN PROSTATIC HYPERPLASIA WITH NOCTURIA: ICD-10-CM

## 2021-07-15 DIAGNOSIS — N40.1 BENIGN PROSTATIC HYPERPLASIA WITH NOCTURIA: ICD-10-CM

## 2021-07-15 DIAGNOSIS — K64.1 GRADE II HEMORRHOIDS: ICD-10-CM

## 2021-07-15 DIAGNOSIS — F98.8 ATTENTION DEFICIT DISORDER (ADD) WITHOUT HYPERACTIVITY: ICD-10-CM

## 2021-07-15 PROCEDURE — G0439 PPPS, SUBSEQ VISIT: HCPCS | Performed by: FAMILY MEDICINE

## 2021-07-15 RX ORDER — TAMSULOSIN HYDROCHLORIDE 0.4 MG/1
1 CAPSULE ORAL DAILY
Qty: 90 CAPSULE | Refills: 3 | Status: SHIPPED | OUTPATIENT
Start: 2021-07-15 | End: 2022-12-21

## 2021-07-15 RX ORDER — LISINOPRIL 10 MG/1
10 TABLET ORAL DAILY
Qty: 90 TABLET | Refills: 1 | Status: SHIPPED | OUTPATIENT
Start: 2021-07-15 | End: 2021-12-21 | Stop reason: SDUPTHER

## 2021-07-15 NOTE — PROGRESS NOTES
"Chief Complaint  Medicare Wellness-subsequent    Subjective    History of Present Illness    Beck Sears presents for Annual Wellness Visit as well as for follow-up on chronic medical problems including hypertension.     Past Medical History:   Diagnosis Date   • Hyperlipidemia 11/14/2019     Past Surgical History:   Procedure Laterality Date   • BACK SURGERY     • CATARACT EXTRACTION       Family History   Problem Relation Age of Onset   • Cancer Mother    • Cancer Father    • Heart disease Father    • Hypertension Father      Social History     Tobacco Use   • Smoking status: Never Smoker   • Smokeless tobacco: Never Used   Substance Use Topics   • Alcohol use: No     Vitals:    07/15/21 1357   BP: 168/81   BP Location: Right arm   Patient Position: Sitting   Cuff Size: Adult   Pulse: 84   Resp: 16   Temp: 98.1 °F (36.7 °C)   TempSrc: Infrared   SpO2: 96%   Weight: 74.5 kg (164 lb 3.2 oz)   Height: 175.3 cm (69\")     Body mass index is 24.25 kg/m².    Result Review :                  The ABCs of the Annual Wellness Visit  Subsequent Medicare Wellness Visit    Chief Complaint   Patient presents with   • Medicare Wellness-subsequent       Subjective   History of Present Illness:  Beck Sears is a 76 y.o. male who presents for a Subsequent Medicare Wellness Visit.    HEALTH RISK ASSESSMENT    Recent Hospitalizations:  No hospitalization(s) within the last year.    Current Medical Providers:  Patient Care Team:  Lyell, Reggie Duane, MD as PCP - General    Smoking Status:  Social History     Tobacco Use   Smoking Status Never Smoker   Smokeless Tobacco Never Used       Alcohol Consumption:  Social History     Substance and Sexual Activity   Alcohol Use No       Depression Screen:   PHQ-2/PHQ-9 Depression Screening 7/15/2021   Little interest or pleasure in doing things 0   Feeling down, depressed, or hopeless 0   Trouble falling or staying asleep, or sleeping too much 0   Feeling tired or having little " energy 0   Poor appetite or overeating 0   Feeling bad about yourself - or that you are a failure or have let yourself or your family down 0   Trouble concentrating on things, such as reading the newspaper or watching television 0   Moving or speaking so slowly that other people could have noticed. Or the opposite - being so fidgety or restless that you have been moving around a lot more than usual 0   Thoughts that you would be better off dead, or of hurting yourself in some way 0   Total Score 0   If you checked off any problems, how difficult have these problems made it for you to do your work, take care of things at home, or get along with other people? Not difficult at all       Fall Risk Screen:  JADE Fall Risk Assessment was completed, and patient is at LOW risk for falls.Assessment completed on:7/15/2021    Health Habits and Functional and Cognitive Screening:  Functional & Cognitive Status 7/15/2021   Do you have difficulty preparing food and eating? No   Do you have difficulty bathing yourself, getting dressed or grooming yourself? No   Do you have difficulty using the toilet? No   Do you have difficulty moving around from place to place? No   Do you have trouble with steps or getting out of a bed or a chair? No   Current Diet Well Balanced Diet   Dental Exam Up to date   Eye Exam Up to date   Exercise (times per week) 5 times per week   Current Exercises Include Walking   Do you need help using the phone?  No   Are you deaf or do you have serious difficulty hearing?  No   Do you need help with transportation? No   Do you need help shopping? No   Do you need help preparing meals?  No   Do you need help with housework?  No   Do you need help with laundry? No   Do you need help taking your medications? No   Do you need help managing money? No   Do you ever drive or ride in a car without wearing a seat belt? No   Have you felt unusual stress, anger or loneliness in the last month? No   Who do you live with?  Spouse   If you need help, do you have trouble finding someone available to you? No   Do you have difficulty concentrating, remembering or making decisions? No         Does the patient have evidence of cognitive impairment? No    Asprin use counseling:Does not need ASA but is currently taking (advised patient that ASA is not indicated and patient chooses to stop it)    Age-appropriate Screening Schedule:  Refer to the list below for future screening recommendations based on patient's age, sex and/or medical conditions. Orders for these recommended tests are listed in the plan section. The patient has been provided with a written plan.    Health Maintenance   Topic Date Due   • ZOSTER VACCINE (2 of 3) 03/01/2013   • INFLUENZA VACCINE  08/01/2021   • LIPID PANEL  08/13/2021   • TDAP/TD VACCINES (2 - Td or Tdap) 08/18/2024          The following portions of the patient's history were reviewed and updated as appropriate: allergies, current medications, past family history, past medical history, past social history, past surgical history and problem list.    Outpatient Medications Prior to Visit   Medication Sig Dispense Refill   • aspirin 81 MG tablet ASPIRIN 81 MG ORAL TABLET     • Glucosamine-Chondroit-Vit C-Mn (GLUCOSAMINE CHONDR 500 COMPLEX) capsule GLUCOSAMINE CHONDR 500 COMPLEX CAPS     • Hydrocortisone, Perianal, (ANUSOL-HC) 2.5 % rectal cream APPLY RECTALLY TO THE AFFECTED AREA TWICE DAILY AS NEEDED 30 g 1   • melatonin ([employer] MELATONIN) 5 MG tablet tablet [employer]S MELATONIN 5 MG TABS     • Multiple Vitamins-Minerals (MENS MULTI VITAMIN & MINERAL) tablet MENS MULTI VITAMIN & MINERAL TABS     • simvastatin (ZOCOR) 40 MG tablet TAKE 1 TABLET BY MOUTH DAILY 90 tablet 1   • triamcinolone (KENALOG) 0.1 % cream AMANUEL EXT TO FEET BID     • hydrocortisone (PROCTOZONE-HC) 2.5 % rectal cream Insert  into the rectum 2 (Two) Times a Day. 28.35 g 5   • ibuprofen (ADVIL,MOTRIN) 600 MG tablet Take 1 tablet by mouth Every 6 (Six) Hours  "As Needed for Mild Pain . 30 tablet 0   • tamsulosin (FLOMAX) 0.4 MG capsule 24 hr capsule Take 1 capsule by mouth Daily. 90 capsule 3     No facility-administered medications prior to visit.       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 Patient has an advance directive in EMR which is still valid.     Review of Systems    Compared to one year ago, the patient feels his physical health is the same.  Compared to one year ago, the patient feels his mental health is the same.    Reviewed chart for potential of high risk medication in the elderly: yes  Reviewed chart for potential of harmful drug interactions in the elderly:yes    Objective         Vitals:    07/15/21 1357   BP: 168/81   BP Location: Right arm   Patient Position: Sitting   Cuff Size: Adult   Pulse: 84   Resp: 16   Temp: 98.1 °F (36.7 °C)   TempSrc: Infrared   SpO2: 96%   Weight: 74.5 kg (164 lb 3.2 oz)   Height: 175.3 cm (69\")   PainSc: 0-No pain       Body mass index is 24.25 kg/m².  Discussed the patient's BMI with him. The BMI is in the acceptable range.    Physical Exam          Assessment/Plan   Medicare Risks and Personalized Health Plan  CMS Preventative Services Quick Reference  Advance Directive Discussion  Depression/Dysphoria  Fall Risk    The above risks/problems have been discussed with the " patient.  Pertinent information has been shared with the patient in the After Visit Summary.  Follow up plans and orders are seen below in the Assessment/Plan Section.    Diagnoses and all orders for this visit:    1. Medicare annual wellness visit, subsequent (Primary)  Assessment & Plan:  Updated annual wellness visit checklist.  Immunizations up-to-date.  Screening up-to-date.  Recommend yearly dental and eye exams. Also discussed monitoring of blood pressure and lipids.      2. Essential hypertension  Assessment & Plan:  Will start low-dose lisinopril and recheck blood pressure at home.  He has a calibrated home blood pressure device.      3. Mixed hyperlipidemia  Assessment & Plan:  Recheck blood work.    Orders:  -     Lipid Panel    4. Seasonal allergic rhinitis due to pollen  Assessment & Plan:  Stable with OTC meds.      5. Grade II hemorrhoids  Assessment & Plan:  Previous evaluation by colorectal surgery.  Continue Proctosol HC cream.      6. Benign prostatic hyperplasia with nocturia  Assessment & Plan:  Worsening with nocturia thrre times a night. Will get in with urology.  He inadvertently stopped Flomax.  We will get this restarted before his urology appointment.      7. Attention deficit disorder (ADD) without hyperactivity    8. Primary osteoarthritis involving multiple joints  Assessment & Plan:  At baseline.      9. 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      10. Primary insomnia    11. Screening PSA (prostate specific antigen)  -     PSA Screen    12. Nocturia  -     Ambulatory Referral to Urology    Other orders  -     lisinopril (PRINIVIL,ZESTRIL) 10 MG tablet; Take 1 tablet by mouth Daily.  Dispense: 90 tablet; Refill: 1  -     tamsulosin (FLOMAX) 0.4 MG capsule 24 hr capsule; Take 1 capsule by mouth Daily.  Dispense: 90 capsule; Refill: 3    Follow Up:  Return in about 1 year (around 7/15/2022) for Medicare Wellness.     An After Visit  Summary and PPPS were given to the patient.

## 2021-07-15 NOTE — ASSESSMENT & PLAN NOTE
Will start low-dose lisinopril and recheck blood pressure at home.  He has a calibrated home blood pressure device.

## 2021-07-15 NOTE — ASSESSMENT & PLAN NOTE
Worsening with nocturia thrre times a night. Will get in with urology.  He inadvertently stopped Flomax.  We will get this restarted before his urology appointment.

## 2021-07-17 LAB
CHOLEST SERPL-MCNC: 173 MG/DL (ref 100–199)
HDLC SERPL-MCNC: 57 MG/DL
LDLC SERPL CALC-MCNC: 88 MG/DL (ref 0–99)
PSA SERPL-MCNC: 2.5 NG/ML (ref 0–4)
TRIGL SERPL-MCNC: 164 MG/DL (ref 0–149)
VLDLC SERPL CALC-MCNC: 28 MG/DL (ref 5–40)

## 2021-10-19 RX ORDER — HYDROCORTISONE 25 MG/G
CREAM TOPICAL 2 TIMES DAILY PRN
Qty: 30 G | Refills: 1 | Status: SHIPPED | OUTPATIENT
Start: 2021-10-19 | End: 2022-08-17 | Stop reason: SDUPTHER

## 2021-12-21 RX ORDER — LISINOPRIL 10 MG/1
TABLET ORAL
Qty: 90 TABLET | Refills: 0 | Status: SHIPPED | OUTPATIENT
Start: 2021-12-21 | End: 2022-03-22

## 2022-03-22 RX ORDER — LISINOPRIL 10 MG/1
TABLET ORAL
Qty: 90 TABLET | Refills: 0 | Status: SHIPPED | OUTPATIENT
Start: 2022-03-22 | End: 2022-06-15 | Stop reason: SDUPTHER

## 2022-03-22 RX ORDER — SIMVASTATIN 40 MG
TABLET ORAL
Qty: 90 TABLET | Refills: 0 | Status: SHIPPED | OUTPATIENT
Start: 2022-03-22 | End: 2022-06-15 | Stop reason: SDUPTHER

## 2022-06-15 ENCOUNTER — OFFICE VISIT (OUTPATIENT)
Dept: FAMILY MEDICINE CLINIC | Facility: CLINIC | Age: 77
End: 2022-06-15

## 2022-06-15 VITALS
OXYGEN SATURATION: 96 % | RESPIRATION RATE: 16 BRPM | HEART RATE: 67 BPM | HEIGHT: 69 IN | BODY MASS INDEX: 24.44 KG/M2 | DIASTOLIC BLOOD PRESSURE: 69 MMHG | TEMPERATURE: 98.1 F | SYSTOLIC BLOOD PRESSURE: 134 MMHG | WEIGHT: 165 LBS

## 2022-06-15 DIAGNOSIS — R35.1 NOCTURIA: ICD-10-CM

## 2022-06-15 DIAGNOSIS — E78.2 MIXED HYPERLIPIDEMIA: ICD-10-CM

## 2022-06-15 DIAGNOSIS — N40.1 BENIGN PROSTATIC HYPERPLASIA WITH NOCTURIA: ICD-10-CM

## 2022-06-15 DIAGNOSIS — I10 ESSENTIAL HYPERTENSION: Primary | ICD-10-CM

## 2022-06-15 DIAGNOSIS — R82.90 ABNORMAL URINALYSIS: ICD-10-CM

## 2022-06-15 DIAGNOSIS — R35.1 BENIGN PROSTATIC HYPERPLASIA WITH NOCTURIA: ICD-10-CM

## 2022-06-15 DIAGNOSIS — R31.29 OTHER MICROSCOPIC HEMATURIA: ICD-10-CM

## 2022-06-15 LAB
BILIRUB BLD-MCNC: NEGATIVE MG/DL
CLARITY, POC: CLEAR
COLOR UR: YELLOW
EXPIRATION DATE: ABNORMAL
GLUCOSE UR STRIP-MCNC: NEGATIVE MG/DL
KETONES UR QL: NEGATIVE
LEUKOCYTE EST, POC: NEGATIVE
Lab: ABNORMAL
NITRITE UR-MCNC: NEGATIVE MG/ML
PH UR: 5 [PH] (ref 5–8)
PROT UR STRIP-MCNC: NEGATIVE MG/DL
RBC # UR STRIP: ABNORMAL /UL
SP GR UR: 1.01 (ref 1–1.03)
UROBILINOGEN UR QL: NORMAL

## 2022-06-15 PROCEDURE — 81003 URINALYSIS AUTO W/O SCOPE: CPT | Performed by: NURSE PRACTITIONER

## 2022-06-15 PROCEDURE — 99214 OFFICE O/P EST MOD 30 MIN: CPT | Performed by: NURSE PRACTITIONER

## 2022-06-15 RX ORDER — CIPROFLOXACIN 500 MG/1
500 TABLET, FILM COATED ORAL 2 TIMES DAILY
Qty: 20 TABLET | Refills: 0 | Status: SHIPPED | OUTPATIENT
Start: 2022-06-15 | End: 2022-06-25

## 2022-06-15 RX ORDER — CHOLECALCIFEROL (VITAMIN D3) 25 MCG
1000 CAPSULE ORAL DAILY
COMMUNITY

## 2022-06-15 RX ORDER — LISINOPRIL 10 MG/1
10 TABLET ORAL DAILY
Qty: 90 TABLET | Refills: 0 | Status: SHIPPED | OUTPATIENT
Start: 2022-06-15 | End: 2022-07-20 | Stop reason: SDUPTHER

## 2022-06-15 RX ORDER — CLOBETASOL PROPIONATE 0.5 MG/G
1 CREAM TOPICAL AS NEEDED
COMMUNITY

## 2022-06-15 RX ORDER — SIMVASTATIN 40 MG
40 TABLET ORAL DAILY
Qty: 90 TABLET | Refills: 0 | Status: SHIPPED | OUTPATIENT
Start: 2022-06-15 | End: 2022-07-20 | Stop reason: SDUPTHER

## 2022-06-15 NOTE — PROGRESS NOTES
Chief Complaint  Hypertension (Med refill)    Subjective          Beck Sears presents to [facility] for hypertension hyperlipidemia and nocturia    History of Present Illness    Patient is here to establish after his provider recently off boarded to intermediate    He was recently started on blood pressure medication has been well controlled without any side effects.  He also takes cholesterol medicines and is due for fasting labs.  He has no side effects or problems with that medication.  He is very active and exercises on a regular basis.  He takes some supplements as well.    Patient has a history of nocturia for some time as well as hematuria.  He was given Flomax and reports that it does not help him at all so he stopped it.  He is getting up about 3-4 times a night.  Previously he had a work-up for hematuria but that was sometime back.  He was told it was benign.  He has not been back to urology since that time.      Beck Sears  has a past medical history of Hyperlipidemia (11/14/2019).      Review of Systems   Constitutional: Negative for fatigue.   Respiratory: Negative for cough and shortness of breath.    Cardiovascular: Negative for chest pain.   Genitourinary:        Nocturia   Skin:        Seeing dermatology for skin lesions today        Objective       Current Outpatient Medications:   •  aspirin 81 MG tablet, ASPIRIN 81 MG ORAL TABLET, Disp: , Rfl:   •  Cholecalciferol (Vitamin D High Potency) 25 MCG (1000 UT) capsule, Take 1,000 Units by mouth Daily., Disp: , Rfl:   •  clobetasol (TEMOVATE) 0.05 % cream, Apply 1 application topically to the appropriate area as directed As Needed., Disp: , Rfl:   •  Glucosamine-Chondroit-Vit C-Mn (GLUCOSAMINE CHONDR 500 COMPLEX) capsule, GLUCOSAMINE CHONDR 500 COMPLEX CAPS, Disp: , Rfl:   •  hydrocortisone (PROCTOZONE-HC) 2.5 % rectal cream, Insert  into the rectum 2 (Two) Times a Day. (Patient taking differently: Insert  " into the rectum As Needed.), Disp: 28.35 g, Rfl: 5  •  ibuprofen (ADVIL,MOTRIN) 600 MG tablet, Take 1 tablet by mouth Every 6 (Six) Hours As Needed for Mild Pain ., Disp: 30 tablet, Rfl: 0  •  lisinopril (PRINIVIL,ZESTRIL) 10 MG tablet, Take 1 tablet by mouth Daily., Disp: 90 tablet, Rfl: 0  •  melatonin 5 MG tablet tablet, 10 mg Every Night., Disp: , Rfl:   •  Multiple Vitamins-Minerals (MENS MULTI VITAMIN & MINERAL) tablet, MENS MULTI VITAMIN & MINERAL TABS, Disp: , Rfl:   •  simvastatin (ZOCOR) 40 MG tablet, Take 1 tablet by mouth Daily., Disp: 90 tablet, Rfl: 0  •  triamcinolone (KENALOG) 0.1 % cream, AMANUEL EXT TO FEET BID, Disp: , Rfl:   •  Zinc 50 MG capsule, Take 50 mg by mouth Daily., Disp: , Rfl:   •  ciprofloxacin (Cipro) 500 MG tablet, Take 1 tablet by mouth 2 (Two) Times a Day for 10 days., Disp: 20 tablet, Rfl: 0  •  Hydrocortisone, Perianal, (ANUSOL-HC) 2.5 % rectal cream, Insert  into the rectum 2 (Two) Times a Day As Needed for Hemorrhoids., Disp: 30 g, Rfl: 1  •  tamsulosin (FLOMAX) 0.4 MG capsule 24 hr capsule, Take 1 capsule by mouth Daily., Disp: 90 capsule, Rfl: 3    Vital Signs:      /69 (BP Location: Right arm, Patient Position: Sitting, Cuff Size: Adult)   Pulse 67   Temp 98.1 °F (36.7 °C) (Infrared)   Resp 16   Ht 175.3 cm (69\")   Wt 74.8 kg (165 lb)   SpO2 96%   BMI 24.37 kg/m²     Vitals:    06/15/22 1019   BP: 134/69   BP Location: Right arm   Patient Position: Sitting   Cuff Size: Adult   Pulse: 67   Resp: 16   Temp: 98.1 °F (36.7 °C)   TempSrc: Infrared   SpO2: 96%   Weight: 74.8 kg (165 lb)   Height: 175.3 cm (69\")      Physical Exam  Vitals and nursing note reviewed.   Constitutional:       Appearance: He is well-developed.   Cardiovascular:      Rate and Rhythm: Normal rate and regular rhythm.      Heart sounds: Normal heart sounds. No murmur heard.    No friction rub. No gallop.   Pulmonary:      Effort: Pulmonary effort is normal.      Breath sounds: Normal breath " sounds. No wheezing or rales.   Abdominal:      General: Bowel sounds are normal.      Palpations: Abdomen is soft.      Tenderness: There is no abdominal tenderness.   Skin:     General: Skin is warm and dry.   Neurological:      Mental Status: He is alert.   Psychiatric:         Mood and Affect: Mood normal.         Behavior: Behavior normal.         Thought Content: Thought content normal.         Judgment: Judgment normal.        Result Review :                PHQ-9 Depression Screening  Little interest or pleasure in doing things?     Feeling down, depressed, or hopeless?     Trouble falling or staying asleep, or sleeping too much?     Feeling tired or having little energy?     Poor appetite or overeating?     Feeling bad about yourself - or that you are a failure or have let yourself or your family down?     Trouble concentrating on things, such as reading the newspaper or watching television?     Moving or speaking so slowly that other people could have noticed? Or the opposite - being so fidgety or restless that you have been moving around a lot more than usual?     Thoughts that you would be better off dead, or of hurting yourself in some way?     PHQ-9 Total Score     If you checked off any problems, how difficult have these problems made it for you to do your work, take care of things at home, or get along with other people?             Assessment and Plan    Diagnoses and all orders for this visit:    1. Essential hypertension (Primary)  Assessment & Plan:  Hypertension is improving with treatment.  Continue current treatment regimen.  Blood pressure will be reassessed at the next regular appointment.    Orders:  -     Comprehensive Metabolic Panel    2. Mixed hyperlipidemia  Assessment & Plan:  Lipid abnormalities are improving with treatment.  Pharmacotherapy as ordered.  Lipids will be reassessed Due for labs now.    Orders:  -     Comprehensive Metabolic Panel  -     Lipid Panel    3. Abnormal  urinalysis  -     PSA DIAGNOSTIC ONLY  -     Urine Culture - Urine, Urine, Clean Catch  -     POC Urinalysis Dipstick, Automated    4. Nocturia  -     PSA DIAGNOSTIC ONLY  -     Ambulatory Referral to Urology    5. Benign prostatic hyperplasia with nocturia  Assessment & Plan:  Reports Flomax is not helping him.  Urinalysis was taken today and he was started on Cipro for prostatitis.  Will refer to urology secondary to failure on Flomax.  PSA also requested    Orders:  -     Ambulatory Referral to Urology    6. Other microscopic hematuria  Comments:  Worsening may be prostatitis.  Started Cipro and will refer to urology.  Orders:  -     Ambulatory Referral to Urology    Other orders  -     lisinopril (PRINIVIL,ZESTRIL) 10 MG tablet; Take 1 tablet by mouth Daily.  Dispense: 90 tablet; Refill: 0  -     simvastatin (ZOCOR) 40 MG tablet; Take 1 tablet by mouth Daily.  Dispense: 90 tablet; Refill: 0  -     ciprofloxacin (Cipro) 500 MG tablet; Take 1 tablet by mouth 2 (Two) Times a Day for 10 days.  Dispense: 20 tablet; Refill: 0       Problem List Items Addressed This Visit        Active Problems    Hyperlipidemia (Chronic)    Current Assessment & Plan     Lipid abnormalities are improving with treatment.  Pharmacotherapy as ordered.  Lipids will be reassessed Due for labs now.           Relevant Medications    simvastatin (ZOCOR) 40 MG tablet    Other Relevant Orders    Comprehensive Metabolic Panel    Lipid Panel    Benign prostatic hyperplasia    Current Assessment & Plan     Reports Flomax is not helping him.  Urinalysis was taken today and he was started on Cipro for prostatitis.  Will refer to urology secondary to failure on Flomax.  PSA also requested           Relevant Orders    Ambulatory Referral to Urology (Completed)    Essential hypertension - Primary    Current Assessment & Plan     Hypertension is improving with treatment.  Continue current treatment regimen.  Blood pressure will be reassessed at the next  regular appointment.           Relevant Medications    lisinopril (PRINIVIL,ZESTRIL) 10 MG tablet    Other Relevant Orders    Comprehensive Metabolic Panel      Other Visit Diagnoses     Abnormal urinalysis        Relevant Orders    PSA DIAGNOSTIC ONLY    Urine Culture - Urine, Urine, Clean Catch    POC Urinalysis Dipstick, Automated (Completed)    Nocturia        Relevant Orders    PSA DIAGNOSTIC ONLY    Ambulatory Referral to Urology (Completed)    Other microscopic hematuria        Worsening may be prostatitis.  Started Cipro and will refer to urology.    Relevant Orders    Ambulatory Referral to Urology (Completed)          Follow Up {Instructions Charge Capture  Follow-up Communications :23}  Return in about 6 months (around 12/15/2022) for Recheck b/p.  Patient was given instructions and counseling regarding his condition or for health maintenance advice. Please see specific information pulled into the AVS if appropriate.

## 2022-06-15 NOTE — ASSESSMENT & PLAN NOTE
Reports Flomax is not helping him.  Urinalysis was taken today and he was started on Cipro for prostatitis.  Will refer to urology secondary to failure on Flomax.  PSA also requested

## 2022-06-15 NOTE — ASSESSMENT & PLAN NOTE
Lipid abnormalities are improving with treatment.  Pharmacotherapy as ordered.  Lipids will be reassessed Due for labs now.

## 2022-06-16 ENCOUNTER — APPOINTMENT (OUTPATIENT)
Dept: GENERAL RADIOLOGY | Facility: HOSPITAL | Age: 77
End: 2022-06-16

## 2022-06-16 ENCOUNTER — APPOINTMENT (OUTPATIENT)
Dept: CT IMAGING | Facility: HOSPITAL | Age: 77
End: 2022-06-16

## 2022-06-16 ENCOUNTER — HOSPITAL ENCOUNTER (EMERGENCY)
Facility: HOSPITAL | Age: 77
Discharge: HOME | End: 2022-06-16
Attending: EMERGENCY MEDICINE | Admitting: EMERGENCY MEDICINE

## 2022-06-16 VITALS
HEIGHT: 69 IN | WEIGHT: 164.24 LBS | HEART RATE: 63 BPM | OXYGEN SATURATION: 95 % | BODY MASS INDEX: 24.33 KG/M2 | SYSTOLIC BLOOD PRESSURE: 150 MMHG | TEMPERATURE: 97.9 F | DIASTOLIC BLOOD PRESSURE: 84 MMHG | RESPIRATION RATE: 18 BRPM

## 2022-06-16 DIAGNOSIS — J69.0 ASPIRATION PNEUMONIA OF RIGHT LUNG, UNSPECIFIED ASPIRATION PNEUMONIA TYPE, UNSPECIFIED PART OF LUNG: Primary | ICD-10-CM

## 2022-06-16 LAB
ALBUMIN SERPL-MCNC: 4 G/DL (ref 3.5–5.2)
ALBUMIN/GLOB SERPL: 1.6 G/DL
ALP SERPL-CCNC: 51 U/L (ref 39–117)
ALT SERPL W P-5'-P-CCNC: 22 U/L (ref 1–41)
ANION GAP SERPL CALCULATED.3IONS-SCNC: 12 MMOL/L (ref 5–15)
APTT PPP: 28.5 SECONDS (ref 61–76.5)
AST SERPL-CCNC: 22 U/L (ref 1–40)
BASOPHILS # BLD AUTO: 0.1 10*3/MM3 (ref 0–0.2)
BASOPHILS NFR BLD AUTO: 0.8 % (ref 0–1.5)
BILIRUB SERPL-MCNC: 0.4 MG/DL (ref 0–1.2)
BUN SERPL-MCNC: 21 MG/DL (ref 8–23)
BUN/CREAT SERPL: 20.8 (ref 7–25)
CALCIUM SPEC-SCNC: 8.7 MG/DL (ref 8.6–10.5)
CHLORIDE SERPL-SCNC: 104 MMOL/L (ref 98–107)
CO2 SERPL-SCNC: 23 MMOL/L (ref 22–29)
CREAT SERPL-MCNC: 1.01 MG/DL (ref 0.76–1.27)
D DIMER PPP FEU-MCNC: 0.92 MG/L (FEU) (ref 0–0.59)
DEPRECATED RDW RBC AUTO: 44.6 FL (ref 37–54)
EGFRCR SERPLBLD CKD-EPI 2021: 77.6 ML/MIN/1.73
EOSINOPHIL # BLD AUTO: 0.4 10*3/MM3 (ref 0–0.4)
EOSINOPHIL NFR BLD AUTO: 4.7 % (ref 0.3–6.2)
ERYTHROCYTE [DISTWIDTH] IN BLOOD BY AUTOMATED COUNT: 15 % (ref 12.3–15.4)
GLOBULIN UR ELPH-MCNC: 2.5 GM/DL
GLUCOSE SERPL-MCNC: 107 MG/DL (ref 65–99)
HCT VFR BLD AUTO: 41.8 % (ref 37.5–51)
HGB BLD-MCNC: 13.9 G/DL (ref 13–17.7)
INR PPP: 1 (ref 0.93–1.1)
LYMPHOCYTES # BLD AUTO: 2.2 10*3/MM3 (ref 0.7–3.1)
LYMPHOCYTES NFR BLD AUTO: 28.8 % (ref 19.6–45.3)
MCH RBC QN AUTO: 28.2 PG (ref 26.6–33)
MCHC RBC AUTO-ENTMCNC: 33.2 G/DL (ref 31.5–35.7)
MCV RBC AUTO: 85.1 FL (ref 79–97)
MONOCYTES # BLD AUTO: 0.9 10*3/MM3 (ref 0.1–0.9)
MONOCYTES NFR BLD AUTO: 12.5 % (ref 5–12)
NEUTROPHILS NFR BLD AUTO: 4 10*3/MM3 (ref 1.7–7)
NEUTROPHILS NFR BLD AUTO: 53.2 % (ref 42.7–76)
NRBC BLD AUTO-RTO: 0 /100 WBC (ref 0–0.2)
PLATELET # BLD AUTO: 233 10*3/MM3 (ref 140–450)
PMV BLD AUTO: 6.8 FL (ref 6–12)
POTASSIUM SERPL-SCNC: 4.1 MMOL/L (ref 3.5–5.2)
PROT SERPL-MCNC: 6.5 G/DL (ref 6–8.5)
PROTHROMBIN TIME: 10.3 SECONDS (ref 9.6–11.7)
RBC # BLD AUTO: 4.92 10*6/MM3 (ref 4.14–5.8)
SODIUM SERPL-SCNC: 139 MMOL/L (ref 136–145)
TROPONIN T SERPL-MCNC: <0.01 NG/ML (ref 0–0.03)
WBC NRBC COR # BLD: 7.5 10*3/MM3 (ref 3.4–10.8)

## 2022-06-16 PROCEDURE — 80053 COMPREHEN METABOLIC PANEL: CPT | Performed by: EMERGENCY MEDICINE

## 2022-06-16 PROCEDURE — 71275 CT ANGIOGRAPHY CHEST: CPT

## 2022-06-16 PROCEDURE — 85610 PROTHROMBIN TIME: CPT | Performed by: EMERGENCY MEDICINE

## 2022-06-16 PROCEDURE — 71045 X-RAY EXAM CHEST 1 VIEW: CPT

## 2022-06-16 PROCEDURE — 93005 ELECTROCARDIOGRAM TRACING: CPT

## 2022-06-16 PROCEDURE — 85379 FIBRIN DEGRADATION QUANT: CPT | Performed by: EMERGENCY MEDICINE

## 2022-06-16 PROCEDURE — 99283 EMERGENCY DEPT VISIT LOW MDM: CPT

## 2022-06-16 PROCEDURE — 85025 COMPLETE CBC W/AUTO DIFF WBC: CPT | Performed by: EMERGENCY MEDICINE

## 2022-06-16 PROCEDURE — 84484 ASSAY OF TROPONIN QUANT: CPT | Performed by: EMERGENCY MEDICINE

## 2022-06-16 PROCEDURE — 85730 THROMBOPLASTIN TIME PARTIAL: CPT | Performed by: EMERGENCY MEDICINE

## 2022-06-16 PROCEDURE — 0 IOPAMIDOL PER 1 ML: Performed by: EMERGENCY MEDICINE

## 2022-06-16 RX ORDER — LEVOFLOXACIN 750 MG/1
750 TABLET ORAL DAILY
Qty: 5 TABLET | Refills: 0 | Status: SHIPPED | OUTPATIENT
Start: 2022-06-16 | End: 2022-07-12

## 2022-06-16 RX ADMIN — IOPAMIDOL 100 ML: 755 INJECTION, SOLUTION INTRAVENOUS at 06:19

## 2022-06-16 NOTE — ED PROVIDER NOTES
Subjective   77-year-old male with onset of severe right-sided chest pain, sharp, that woke him up from sleep at 3 AM.  Patient states the pain lasted for 3 minutes and then resolved spontaneously.  Patient denies taking any medicine to improve the pain.  While the pain was present, there was no exacerbating factors noted.  Patient denies any trauma or cough or fever or history of DVT PE or recent trips or travel.  Patient is a non-smoker without any history of cardiac disease.  There was no associated abdominal pain or nausea or flank pain associated with this.          Review of Systems   Constitutional: Negative.    HENT: Negative.    Respiratory: Negative.    Cardiovascular: Positive for chest pain.   Gastrointestinal: Negative.    Genitourinary: Negative.    Musculoskeletal: Negative.    Skin: Negative.    Neurological: Negative.    Psychiatric/Behavioral: Negative.    All other systems reviewed and are negative.      Past Medical History:   Diagnosis Date   • Hyperlipidemia 11/14/2019       Allergies   Allergen Reactions   • Bee Venom Anaphylaxis   • Penicillins GI Intolerance   • Sulfa Antibiotics Hives       Past Surgical History:   Procedure Laterality Date   • BACK SURGERY     • CATARACT EXTRACTION         Family History   Problem Relation Age of Onset   • Cancer Mother    • Cancer Father    • Heart disease Father    • Hypertension Father        Social History     Socioeconomic History   • Marital status:    Tobacco Use   • Smoking status: Never Smoker   • Smokeless tobacco: Never Used   Vaping Use   • Vaping Use: Never used   Substance and Sexual Activity   • Alcohol use: No   • Drug use: No   • Sexual activity: Defer           Objective   Physical Exam  Constitutional:       Appearance: Normal appearance. He is well-developed.   HENT:      Head: Normocephalic and atraumatic.      Mouth/Throat:      Mouth: Mucous membranes are moist.      Pharynx: Oropharynx is clear.   Eyes:       Conjunctiva/sclera: Conjunctivae normal.      Pupils: Pupils are equal, round, and reactive to light.   Cardiovascular:      Rate and Rhythm: Bradycardia present.      Heart sounds: Normal heart sounds.   Pulmonary:      Effort: Pulmonary effort is normal.      Breath sounds: Normal breath sounds.   Abdominal:      General: Bowel sounds are normal. There is no distension.      Palpations: Abdomen is soft.      Tenderness: There is no abdominal tenderness.   Musculoskeletal:         General: No swelling or tenderness. Normal range of motion.   Skin:     General: Skin is warm and dry.      Capillary Refill: Capillary refill takes less than 2 seconds.   Neurological:      General: No focal deficit present.      Mental Status: He is alert and oriented to person, place, and time.   Psychiatric:         Mood and Affect: Mood normal.         Behavior: Behavior normal.         Procedures           ED Course  ED Course as of 06/16/22 0739   Thu Jun 16, 2022   0433 EKG interpretation: Sinus bradycardia, rate 58, no acute ST change [JR]      ED Course User Index  [JR] Carrillo Perry MD                                                 [facility]  Number of Diagnoses or Management Options  Aspiration pneumonia of right lung, unspecified aspiration pneumonia type, unspecified part of lung (HCC)  Diagnosis management comments: Results for orders placed or performed during the hospital encounter of 06/16/22  -Comprehensive Metabolic Panel:   Specimen: Blood       Result                      Value             Ref Range           Glucose                     107 (H)           65 - 99 mg/dL       BUN                         21                8 - 23 mg/dL        Creatinine                  1.01              0.76 - 1.27 *       Sodium                      139               136 - 145 mm*       Potassium                   4.1               3.5 - 5.2 mm*       Chloride                    104               98 - 107 mmo*       CO2                          23.0              22.0 - 29.0 *       Calcium                     8.7               8.6 - 10.5 m*       Total Protein               6.5               6.0 - 8.5 g/*       Albumin                     4.00              3.50 - 5.20 *       ALT (SGPT)                  22                1 - 41 U/L          AST (SGOT)                  22                1 - 40 U/L          Alkaline Phosphatase        51                39 - 117 U/L        Total Bilirubin             0.4               0.0 - 1.2 mg*       Globulin                    2.5               gm/dL               A/G Ratio                   1.6               g/dL                BUN/Creatinine Ratio        20.8              7.0 - 25.0          Anion Gap                   12.0              5.0 - 15.0 m*       eGFR                        77.6              >60.0 mL/min*  -Protime-INR:   Specimen: Blood       Result                      Value             Ref Range           Protime                     10.3              9.6 - 11.7 S*       INR                         1.00              0.93 - 1.10    -aPTT:   Specimen: Blood       Result                      Value             Ref Range           PTT                         28.5 (L)          61.0 - 76.5 *  -D-dimer, Quantitative:   Specimen: Blood       Result                      Value             Ref Range           D-Dimer, Quantitative       0.92 (H)          0.00 - 0.59 *  -Troponin:   Specimen: Blood       Result                      Value             Ref Range           Troponin T                  <0.010            0.000 - 0.03*  -CBC Auto Differential:   Specimen: Blood       Result                      Value             Ref Range           WBC                         7.50              3.40 - 10.80*       RBC                         4.92              4.14 - 5.80 *       Hemoglobin                  13.9              13.0 - 17.7 *       Hematocrit                  41.8              37.5 - 51.0 %       MCV                          85.1              79.0 - 97.0 *       MCH                         28.2              26.6 - 33.0 *       MCHC                        33.2              31.5 - 35.7 *       RDW                         15.0              12.3 - 15.4 %       RDW-SD                      44.6              37.0 - 54.0 *       MPV                         6.8               6.0 - 12.0 fL       Platelets                   233               140 - 450 10*       Neutrophil %                53.2              42.7 - 76.0 %       Lymphocyte %                28.8              19.6 - 45.3 %       Monocyte %                  12.5 (H)          5.0 - 12.0 %        Eosinophil %                4.7               0.3 - 6.2 %         Basophil %                  0.8               0.0 - 1.5 %         Neutrophils, Absolute       4.00              1.70 - 7.00 *       Lymphocytes, Absolute       2.20              0.70 - 3.10 *       Monocytes, Absolute         0.90              0.10 - 0.90 *       Eosinophils, Absolute       0.40              0.00 - 0.40 *       Basophils, Absolute         0.10              0.00 - 0.20 *       nRBC                        0.0               0.0 - 0.2 /1*  -ECG 12 Lead:        Result                      Value             Ref Range           QT Interval                 410               ms             CT Angiogram Chest Pulmonary Embolism   Final Result        1.  Peripheral right lower lung bandlike groundglass opacity, consider infectious or inflammatory etiologies.    2.  No pulmonary embolus.    3.  No acute aortic findings.        Electronically signed by:  Sherine Miranda M.D.      6/16/2022 5:02 AM     XR Chest 1 View   Final Result    No acute cardiopulmonary abnormality.        Electronically signed by:  Luis Landis M.D.      6/16/2022 4:57 AM       Suspect aspiration pneumonitis given sudden onset in the middle the night.  Patient's not had any occurrence of right-sided chest pain.  Patient otherwise feels well.   Patient had Cipro called in for possible prostatitis.  I recommended patient take the respiratory max quinolone for 5 days.  I instructed him to follow-up with his PCP regarding whether or not to transition to Cipro for a lengthier amount of time.  Patient does report rash with penicillin as I had initially preferred Augmentin.       Amount and/or Complexity of Data Reviewed  Clinical lab tests: ordered and reviewed  Tests in the radiology section of CPT®: reviewed and ordered  Tests in the medicine section of CPT®: reviewed and ordered        Final diagnoses:   Aspiration pneumonia of right lung, unspecified aspiration pneumonia type, unspecified part of lung (HCC)       ED Disposition  ED Disposition     ED Disposition   Discharge    Condition   Stable    Comment   --             Parvin Villatoro, APRN  [address]  [place] [address]  [phone]    Schedule an appointment as soon as possible for a visit            Medication List      New Prescriptions    levoFLOXacin 750 MG tablet  Commonly known as: LEVAQUIN  Take 1 tablet by mouth Daily.        Changed    * hydrocortisone 2.5 % rectal cream  Commonly known as: Proctozone-HC  Insert  into the rectum 2 (Two) Times a Day.  What changed:   · when to take this  · reasons to take this     * Hydrocortisone (Perianal) 2.5 % rectal cream  Commonly known as: ANUSOL-HC  Insert  into the rectum 2 (Two) Times a Day As Needed for Hemorrhoids.  What changed: Another medication with the same name was changed. Make sure you understand how and when to take each.         * This list has 2 medication(s) that are the same as other medications prescribed for you. Read the directions carefully, and ask your doctor or other care provider to review them with you.               Where to Get Your Medications      These medications were sent to [facility] - [place], [place] - [address] - [phone]  - [phone]   [address] [place]  [address]    Phone: [phone]   · levoFLOXacin 750 MG tablet          Carrillo Perry MD  06/16/22 0730

## 2022-06-17 LAB
BACTERIA UR CULT: NO GROWTH
BACTERIA UR CULT: NORMAL
QT INTERVAL: 410 MS

## 2022-06-18 LAB
ALBUMIN SERPL-MCNC: 3.9 G/DL (ref 3.7–4.7)
ALBUMIN/GLOB SERPL: 1.5 {RATIO} (ref 1.2–2.2)
ALP SERPL-CCNC: 60 IU/L (ref 44–121)
ALT SERPL-CCNC: 22 IU/L (ref 0–44)
AST SERPL-CCNC: 20 IU/L (ref 0–40)
BILIRUB SERPL-MCNC: 0.4 MG/DL (ref 0–1.2)
BUN SERPL-MCNC: 18 MG/DL (ref 8–27)
BUN/CREAT SERPL: 15 (ref 10–24)
CALCIUM SERPL-MCNC: 9.1 MG/DL (ref 8.6–10.2)
CHLORIDE SERPL-SCNC: 104 MMOL/L (ref 96–106)
CHOLEST SERPL-MCNC: 176 MG/DL (ref 100–199)
CO2 SERPL-SCNC: 25 MMOL/L (ref 20–29)
CREAT SERPL-MCNC: 1.23 MG/DL (ref 0.76–1.27)
EGFRCR SERPLBLD CKD-EPI 2021: 61 ML/MIN/1.73
GLOBULIN SER CALC-MCNC: 2.6 G/DL (ref 1.5–4.5)
GLUCOSE SERPL-MCNC: 103 MG/DL (ref 65–99)
HDLC SERPL-MCNC: 54 MG/DL
LDLC SERPL CALC-MCNC: 86 MG/DL (ref 0–99)
POTASSIUM SERPL-SCNC: 4.5 MMOL/L (ref 3.5–5.2)
PROT SERPL-MCNC: 6.5 G/DL (ref 6–8.5)
PSA SERPL-MCNC: 1.6 NG/ML (ref 0–4)
SODIUM SERPL-SCNC: 141 MMOL/L (ref 134–144)
TRIGL SERPL-MCNC: 218 MG/DL (ref 0–149)
VLDLC SERPL CALC-MCNC: 36 MG/DL (ref 5–40)

## 2022-07-12 PROCEDURE — U0005 INFEC AGEN DETEC AMPLI PROBE: HCPCS | Performed by: FAMILY MEDICINE

## 2022-07-12 PROCEDURE — U0004 COV-19 TEST NON-CDC HGH THRU: HCPCS | Performed by: FAMILY MEDICINE

## 2022-07-20 ENCOUNTER — OFFICE VISIT (OUTPATIENT)
Dept: FAMILY MEDICINE CLINIC | Facility: CLINIC | Age: 77
End: 2022-07-20

## 2022-07-20 VITALS
DIASTOLIC BLOOD PRESSURE: 82 MMHG | HEIGHT: 69 IN | HEART RATE: 82 BPM | OXYGEN SATURATION: 96 % | RESPIRATION RATE: 16 BRPM | BODY MASS INDEX: 24.29 KG/M2 | TEMPERATURE: 98 F | WEIGHT: 164 LBS | SYSTOLIC BLOOD PRESSURE: 138 MMHG

## 2022-07-20 DIAGNOSIS — N40.1 BENIGN PROSTATIC HYPERPLASIA WITH NOCTURIA: ICD-10-CM

## 2022-07-20 DIAGNOSIS — I10 ESSENTIAL HYPERTENSION: ICD-10-CM

## 2022-07-20 DIAGNOSIS — E78.2 MIXED HYPERLIPIDEMIA: Chronic | ICD-10-CM

## 2022-07-20 DIAGNOSIS — R35.1 BENIGN PROSTATIC HYPERPLASIA WITH NOCTURIA: ICD-10-CM

## 2022-07-20 DIAGNOSIS — R73.01 IMPAIRED FASTING GLUCOSE: Primary | ICD-10-CM

## 2022-07-20 LAB
EXPIRATION DATE: NORMAL
HBA1C MFR BLD: 5.8 %
Lab: NORMAL

## 2022-07-20 PROCEDURE — 99213 OFFICE O/P EST LOW 20 MIN: CPT | Performed by: NURSE PRACTITIONER

## 2022-07-20 PROCEDURE — 83036 HEMOGLOBIN GLYCOSYLATED A1C: CPT | Performed by: NURSE PRACTITIONER

## 2022-07-20 RX ORDER — LISINOPRIL 10 MG/1
10 TABLET ORAL DAILY
Qty: 90 TABLET | Refills: 1 | Status: SHIPPED | OUTPATIENT
Start: 2022-07-20 | End: 2022-12-21 | Stop reason: SDUPTHER

## 2022-07-20 RX ORDER — SIMVASTATIN 40 MG
40 TABLET ORAL DAILY
Qty: 90 TABLET | Refills: 1 | Status: SHIPPED | OUTPATIENT
Start: 2022-07-20 | End: 2022-12-21 | Stop reason: SDUPTHER

## 2022-07-20 NOTE — PROGRESS NOTES
Chief Complaint  Hypertension    Subjective          Beck Sears presents to [facility] for hypertension, prediabetes, BPH and hyperlipidemia    History of Present Illness    Patient is here to follow-up after elevated blood pressure and blood sugar.  He is here for an A1c today.  His average blood sugars fasting have been running 103-107.  Dietary history indicates he drinks regular soft drinks.  Lengthy discussion about carbs and soft drinks.    Patient was having BPH even with Flomax.  The PSA had decreased.  He does have an appoint with Dr. Mcdonald tomorrow after having had labs a CT and urine.    Patient is stable on his current statin.  He is not having any side effects or problems.      Beck Sears  has a past medical history of Hyperlipidemia (11/14/2019).      Review of Systems   Constitutional: Negative for fatigue.   Respiratory: Negative for cough and shortness of breath.    Cardiovascular: Negative for chest pain.   Genitourinary:        Nocturia   Skin:        Excision of lesion by Derm        Objective       Current Outpatient Medications:   •  aspirin 81 MG tablet, ASPIRIN 81 MG ORAL TABLET, Disp: , Rfl:   •  Cholecalciferol (Vitamin D High Potency) 25 MCG (1000 UT) capsule, Take 1,000 Units by mouth Daily., Disp: , Rfl:   •  clobetasol (TEMOVATE) 0.05 % cream, Apply 1 application topically to the appropriate area as directed As Needed., Disp: , Rfl:   •  Glucosamine-Chondroit-Vit C-Mn (GLUCOSAMINE CHONDR 500 COMPLEX) capsule, GLUCOSAMINE CHONDR 500 COMPLEX CAPS, Disp: , Rfl:   •  guaiFENesin-codeine (GUAIFENESIN AC) 100-10 MG/5ML liquid, 5 cc p.o. every 4 hours as needed cough, Disp: 180 mL, Rfl: 0  •  hydrocortisone (PROCTOZONE-HC) 2.5 % rectal cream, Insert  into the rectum 2 (Two) Times a Day. (Patient taking differently: Insert  into the rectum As Needed.), Disp: 28.35 g, Rfl: 5  •  Hydrocortisone, Perianal, (ANUSOL-HC) 2.5 % rectal cream, Insert   "into the rectum 2 (Two) Times a Day As Needed for Hemorrhoids., Disp: 30 g, Rfl: 1  •  ibuprofen (ADVIL,MOTRIN) 600 MG tablet, Take 1 tablet by mouth Every 6 (Six) Hours As Needed for Mild Pain ., Disp: 30 tablet, Rfl: 0  •  lisinopril (PRINIVIL,ZESTRIL) 10 MG tablet, Take 1 tablet by mouth Daily., Disp: 90 tablet, Rfl: 1  •  melatonin 5 MG tablet tablet, 5 mg Every Night., Disp: , Rfl:   •  Multiple Vitamins-Minerals (MENS MULTI VITAMIN & MINERAL) tablet, MENS MULTI VITAMIN & MINERAL TABS, Disp: , Rfl:   •  simvastatin (ZOCOR) 40 MG tablet, Take 1 tablet by mouth Daily., Disp: 90 tablet, Rfl: 1  •  tamsulosin (FLOMAX) 0.4 MG capsule 24 hr capsule, Take 1 capsule by mouth Daily., Disp: 90 capsule, Rfl: 3  •  triamcinolone (KENALOG) 0.1 % cream, AMANUEL EXT TO FEET BID, Disp: , Rfl:   •  Zinc 50 MG capsule, Take 50 mg by mouth Daily., Disp: , Rfl:     Vital Signs:      /82   Pulse 82   Temp 98 °F (36.7 °C) (Infrared)   Resp 16   Ht 175.3 cm (69\")   Wt 74.4 kg (164 lb)   SpO2 96%   BMI 24.22 kg/m²     Vitals:    07/20/22 1446 07/20/22 1511   BP: 165/82 138/82   BP Location: Left arm    Patient Position: Sitting    Cuff Size: Adult    Pulse: 82    Resp: 16    Temp: 98 °F (36.7 °C)    TempSrc: Infrared    SpO2: 96%    Weight: 74.4 kg (164 lb)    Height: 175.3 cm (69\")       Physical Exam  Vitals and nursing note reviewed.   Constitutional:       Appearance: He is well-developed.   Cardiovascular:      Rate and Rhythm: Normal rate and regular rhythm.      Heart sounds: Normal heart sounds. No murmur heard.    No friction rub. No gallop.   Pulmonary:      Effort: Pulmonary effort is normal.      Breath sounds: Normal breath sounds. No wheezing or rales.   Abdominal:      General: Bowel sounds are normal.      Palpations: Abdomen is soft.      Tenderness: There is no abdominal tenderness.   Skin:     General: Skin is warm and dry.   Neurological:      Mental Status: He is alert.   Psychiatric:         Mood and " Affect: Mood normal.         Behavior: Behavior normal.         Thought Content: Thought content normal.         Judgment: Judgment normal.        Result Review :                  PHQ-9 Depression Screening  Little interest or pleasure in doing things?     Feeling down, depressed, or hopeless?     Trouble falling or staying asleep, or sleeping too much?     Feeling tired or having little energy?     Poor appetite or overeating?     Feeling bad about yourself - or that you are a failure or have let yourself or your family down?     Trouble concentrating on things, such as reading the newspaper or watching television?     Moving or speaking so slowly that other people could have noticed? Or the opposite - being so fidgety or restless that you have been moving around a lot more than usual?     Thoughts that you would be better off dead, or of hurting yourself in some way?     PHQ-9 Total Score     If you checked off any problems, how difficult have these problems made it for you to do your work, take care of things at home, or get along with other people?             Assessment and Plan    Diagnoses and all orders for this visit:    1. Impaired fasting glucose (Primary)  Assessment & Plan:  Discussed prediabetes.  Patient given instruction on diet and exercise.  Recheck 6-month    Orders:  -     POC Glycosylated Hemoglobin (Hb A1C)    2. Essential hypertension  Assessment & Plan:  Hypertension is improving with treatment.  Continue current medications.  Blood pressure will be reassessed at the next regular appointment.      3. Mixed hyperlipidemia  Assessment & Plan:  Lipid abnormalities are improving with treatment.  Pharmacotherapy as ordered.  Lipids will be reassessed in 6 months.      4. Benign prostatic hyperplasia with nocturia  Comments:  Continue follow-up with Dr. Mcdonald    Other orders  -     lisinopril (PRINIVIL,ZESTRIL) 10 MG tablet; Take 1 tablet by mouth Daily.  Dispense: 90 tablet; Refill: 1  -      simvastatin (ZOCOR) 40 MG tablet; Take 1 tablet by mouth Daily.  Dispense: 90 tablet; Refill: 1       Problem List Items Addressed This Visit        Active Problems    Hyperlipidemia (Chronic)    Current Assessment & Plan     Lipid abnormalities are improving with treatment.  Pharmacotherapy as ordered.  Lipids will be reassessed in 6 months.           Relevant Medications    simvastatin (ZOCOR) 40 MG tablet    Benign prostatic hyperplasia    Essential hypertension    Current Assessment & Plan     Hypertension is improving with treatment.  Continue current medications.  Blood pressure will be reassessed at the next regular appointment.           Relevant Medications    lisinopril (PRINIVIL,ZESTRIL) 10 MG tablet    Impaired fasting glucose - Primary    Current Assessment & Plan     Discussed prediabetes.  Patient given instruction on diet and exercise.  Recheck 6-month           Relevant Orders    POC Glycosylated Hemoglobin (Hb A1C) (Completed)          Follow Up   Return in 6 months (on 1/20/2023) for Recheck prediabetes and htn.  Patient was given instructions and counseling regarding his condition or for health maintenance advice. Please see specific information pulled into the AVS if appropriate.

## 2022-08-17 DIAGNOSIS — K64.1 GRADE II HEMORRHOIDS: Primary | ICD-10-CM

## 2022-08-17 RX ORDER — HYDROCORTISONE 25 MG/G
CREAM TOPICAL 2 TIMES DAILY PRN
Qty: 30 G | Refills: 0 | Status: SHIPPED | OUTPATIENT
Start: 2022-08-17 | End: 2022-11-21 | Stop reason: SDUPTHER

## 2022-08-17 NOTE — TELEPHONE ENCOUNTER
Caller: Beck Sears    Relationship: Self    Best call back number: [phone] CAN LDVM     Requested Prescriptions:   Requested Prescriptions      No prescriptions requested or ordered in this encounter      hydrocortisone (PROCTOZONE-HC) 2.5 % rectal cream  Pharmacy where request should be sent: [facility] - [place], [place] - [address] - [phone]  - [phone]   [phone]     Additional details provided by patient: COMPLETELY OUT OF CREAM, GETTING POP UP THAT SAYS CANNOT BE RE-ORDERED. PLEASE ADVISE.     Does the patient have less than a 3 day supply:  [x] Yes  [] No    Gordy FRAZIER Rep   08/17/22 10:13 EDT

## 2022-11-17 ENCOUNTER — TELEPHONE (OUTPATIENT)
Dept: FAMILY MEDICINE CLINIC | Facility: CLINIC | Age: 77
End: 2022-11-17

## 2022-11-21 DIAGNOSIS — K64.1 GRADE II HEMORRHOIDS: ICD-10-CM

## 2022-11-21 RX ORDER — HYDROCORTISONE 25 MG/G
CREAM TOPICAL 2 TIMES DAILY PRN
Qty: 30 G | Refills: 0 | Status: SHIPPED | OUTPATIENT
Start: 2022-11-21 | End: 2023-04-04

## 2022-12-21 ENCOUNTER — OFFICE VISIT (OUTPATIENT)
Dept: FAMILY MEDICINE CLINIC | Facility: CLINIC | Age: 77
End: 2022-12-21

## 2022-12-21 VITALS
SYSTOLIC BLOOD PRESSURE: 144 MMHG | OXYGEN SATURATION: 99 % | DIASTOLIC BLOOD PRESSURE: 73 MMHG | HEART RATE: 73 BPM | BODY MASS INDEX: 23.99 KG/M2 | HEIGHT: 69 IN | WEIGHT: 162 LBS | RESPIRATION RATE: 16 BRPM | TEMPERATURE: 97.3 F

## 2022-12-21 DIAGNOSIS — R35.1 BENIGN PROSTATIC HYPERPLASIA WITH NOCTURIA: ICD-10-CM

## 2022-12-21 DIAGNOSIS — N40.1 BENIGN PROSTATIC HYPERPLASIA WITH NOCTURIA: ICD-10-CM

## 2022-12-21 DIAGNOSIS — E78.2 MIXED HYPERLIPIDEMIA: Chronic | ICD-10-CM

## 2022-12-21 DIAGNOSIS — I10 ESSENTIAL HYPERTENSION: Primary | ICD-10-CM

## 2022-12-21 PROCEDURE — 99213 OFFICE O/P EST LOW 20 MIN: CPT | Performed by: NURSE PRACTITIONER

## 2022-12-21 RX ORDER — SIMVASTATIN 40 MG
40 TABLET ORAL DAILY
Qty: 90 TABLET | Refills: 1 | Status: SHIPPED | OUTPATIENT
Start: 2022-12-21

## 2022-12-21 RX ORDER — SILODOSIN 8 MG/1
CAPSULE ORAL
COMMUNITY
Start: 2022-11-11

## 2022-12-21 RX ORDER — LISINOPRIL 10 MG/1
10 TABLET ORAL DAILY
Qty: 90 TABLET | Refills: 1 | Status: SHIPPED | OUTPATIENT
Start: 2022-12-21

## 2022-12-21 NOTE — ASSESSMENT & PLAN NOTE
Lipid abnormalities are improving with treatment.  Pharmacotherapy as ordered.  Lipids will be reassessed needs labs now.

## 2022-12-21 NOTE — PROGRESS NOTES
Chief Complaint  Medicare Wellness-subsequent, Hypertension, and Hyperlipidemia    Subjective          Beck Sears presents to [facility] for scheduled for Medicare wellness hypertension hyperlipidemia    History of Present Illness    Patient's decline Medicare wellness at this point.  He would like to go through his routine health problems that he needs to address for prescriptions.    He is feeling well.  He has a list of his blood pressures with him that is mostly in the 120s over 80.  He generally takes his blood pressure medicine every other day instead of every day.  He takes his cholesterol medicine is due for fasting labs and will get them.    Patient had a prostate biopsy and has seen Dr. Mcdonald.  He currently is on Rapaflo and doing well.  The biopsy was negative.    Overall patient is feeling well he had declined flu shot today.  He reports he is up-to-date on his colonoscopy with no other problems.    He  has had a hemorrhoid in done well with the medication that is prescribed occasionally.    Beck Sears  has a past medical history of Hyperlipidemia (11/14/2019).      Review of Systems   Constitutional: Negative for fatigue.   HENT: Negative for ear pain, sinus pain and sore throat.    Eyes: Negative for pain.   Respiratory: Negative for cough and shortness of breath.    Cardiovascular: Negative for chest pain.   Gastrointestinal: Negative for abdominal pain, diarrhea and nausea.   Endocrine: Negative for polyuria.   Genitourinary: Negative for decreased urine volume and dysuria.   Musculoskeletal: Negative for arthralgias.   Skin: Negative for rash.   Allergic/Immunologic: Negative for environmental allergies.   Neurological: Negative for dizziness, numbness and headaches.   Hematological: Does not bruise/bleed easily.   Psychiatric/Behavioral: The patient is not nervous/anxious.         Objective       Current Outpatient Medications:   •  aspirin 81 MG  "tablet, ASPIRIN 81 MG ORAL TABLET, Disp: , Rfl:   •  Cholecalciferol (Vitamin D High Potency) 25 MCG (1000 UT) capsule, Take 1,000 Units by mouth Daily., Disp: , Rfl:   •  clobetasol (TEMOVATE) 0.05 % cream, Apply 1 application topically to the appropriate area as directed As Needed., Disp: , Rfl:   •  Glucosamine-Chondroit-Vit C-Mn (GLUCOSAMINE CHONDR 500 COMPLEX) capsule, GLUCOSAMINE CHONDR 500 COMPLEX CAPS, Disp: , Rfl:   •  Hydrocortisone, Perianal, (ANUSOL-HC) 2.5 % rectal cream, Insert  into the rectum 2 (Two) Times a Day As Needed for Hemorrhoids., Disp: 30 g, Rfl: 0  •  ibuprofen (ADVIL,MOTRIN) 600 MG tablet, Take 1 tablet by mouth Every 6 (Six) Hours As Needed for Mild Pain ., Disp: 30 tablet, Rfl: 0  •  lisinopril (PRINIVIL,ZESTRIL) 10 MG tablet, Take 1 tablet by mouth Daily., Disp: 90 tablet, Rfl: 1  •  melatonin 5 MG tablet tablet, 5 mg Every Night., Disp: , Rfl:   •  Multiple Vitamins-Minerals (MENS MULTI VITAMIN & MINERAL) tablet, MENS MULTI VITAMIN & MINERAL TABS, Disp: , Rfl:   •  silodosin (RAPAFLO) 8 MG capsule capsule, , Disp: , Rfl:   •  simvastatin (ZOCOR) 40 MG tablet, Take 1 tablet by mouth Daily., Disp: 90 tablet, Rfl: 1  •  triamcinolone (KENALOG) 0.1 % cream, AMANUEL EXT TO FEET BID, Disp: , Rfl:   •  Zinc 50 MG capsule, Take 50 mg by mouth Daily., Disp: , Rfl:   •  guaiFENesin-codeine (GUAIFENESIN AC) 100-10 MG/5ML liquid, 5 cc p.o. every 4 hours as needed cough, Disp: 180 mL, Rfl: 0    Vital Signs:      /73 (BP Location: Right arm, Patient Position: Sitting, Cuff Size: Small Adult)   Pulse 73   Temp 97.3 °F (36.3 °C) (Infrared)   Resp 16   Ht 175.3 cm (69\")   Wt 73.5 kg (162 lb)   SpO2 99%   BMI 23.92 kg/m²     Vitals:    12/21/22 0803 12/21/22 0808   BP: 154/76 144/73   BP Location: Left arm Right arm   Patient Position: Sitting Sitting   Cuff Size: Small Adult Small Adult   Pulse: 73    Resp: 16    Temp: 97.3 °F (36.3 °C)    TempSrc: Infrared    SpO2: 99%    Weight: 73.5 kg " "(162 lb)    Height: 175.3 cm (69\")    PainSc: 0-No pain       Physical Exam  Vitals and nursing note reviewed.   Constitutional:       Appearance: He is well-developed.   HENT:      Head: Normocephalic.   Eyes:      Conjunctiva/sclera: Conjunctivae normal.   Cardiovascular:      Rate and Rhythm: Normal rate and regular rhythm.      Heart sounds: Normal heart sounds. No murmur heard.    No friction rub. No gallop.   Pulmonary:      Effort: Pulmonary effort is normal.      Breath sounds: Normal breath sounds. No wheezing or rales.   Abdominal:      General: Bowel sounds are normal.      Palpations: Abdomen is soft.      Tenderness: There is no abdominal tenderness.   Skin:     General: Skin is warm and dry.   Neurological:      Mental Status: He is alert.   Psychiatric:         Mood and Affect: Mood normal.         Behavior: Behavior normal.         Thought Content: Thought content normal.         Judgment: Judgment normal.        Result Review :                  PHQ-9 Total Score: 0           Assessment and Plan    Diagnoses and all orders for this visit:    1. Essential hypertension (Primary)  Assessment & Plan:  Hypertension is improving with treatment.  Continue current treatment regimen.  Blood pressure will be reassessed at the next regular appointment.    Orders:  -     Comprehensive Metabolic Panel  -     Lipid Panel With / Chol / HDL Ratio    2. Mixed hyperlipidemia  Assessment & Plan:  Lipid abnormalities are improving with treatment.  Pharmacotherapy as ordered.  Lipids will be reassessed needs labs now.    Orders:  -     Comprehensive Metabolic Panel  -     Lipid Panel With / Chol / HDL Ratio    3. Benign prostatic hyperplasia with nocturia  Assessment & Plan:  Following with Dr. Mcdonald.  Medication switched to Rapaflo.  Biopsy normal      Other orders  -     simvastatin (ZOCOR) 40 MG tablet; Take 1 tablet by mouth Daily.  Dispense: 90 tablet; Refill: 1  -     lisinopril (PRINIVIL,ZESTRIL) 10 MG tablet; Take " 1 tablet by mouth Daily.  Dispense: 90 tablet; Refill: 1       Problem List Items Addressed This Visit        Active Problems    Hyperlipidemia (Chronic)    Current Assessment & Plan     Lipid abnormalities are improving with treatment.  Pharmacotherapy as ordered.  Lipids will be reassessed needs labs now.         Relevant Medications    simvastatin (ZOCOR) 40 MG tablet    Other Relevant Orders    Comprehensive Metabolic Panel    Lipid Panel With / Chol / HDL Ratio    Benign prostatic hyperplasia    Current Assessment & Plan     Following with Dr. Mcdonald.  Medication switched to Rapaflo.  Biopsy normal         Essential hypertension - Primary    Current Assessment & Plan     Hypertension is improving with treatment.  Continue current treatment regimen.  Blood pressure will be reassessed at the next regular appointment.         Relevant Medications    lisinopril (PRINIVIL,ZESTRIL) 10 MG tablet    Other Relevant Orders    Comprehensive Metabolic Panel    Lipid Panel With / Chol / HDL Ratio       Follow Up   No follow-ups on file.  Patient was given instructions and counseling regarding his condition or for health maintenance advice. Please see specific information pulled into the AVS if appropriate.

## 2022-12-30 LAB
ALBUMIN SERPL-MCNC: 4.3 G/DL (ref 3.7–4.7)
ALBUMIN/GLOB SERPL: 2 {RATIO} (ref 1.2–2.2)
ALP SERPL-CCNC: 55 IU/L (ref 44–121)
ALT SERPL-CCNC: 22 IU/L (ref 0–44)
AST SERPL-CCNC: 24 IU/L (ref 0–40)
BILIRUB SERPL-MCNC: 0.5 MG/DL (ref 0–1.2)
BUN SERPL-MCNC: 17 MG/DL (ref 8–27)
BUN/CREAT SERPL: 15 (ref 10–24)
CALCIUM SERPL-MCNC: 9.1 MG/DL (ref 8.6–10.2)
CHLORIDE SERPL-SCNC: 104 MMOL/L (ref 96–106)
CHOLEST SERPL-MCNC: 185 MG/DL (ref 100–199)
CHOLEST/HDLC SERPL: 2.8 RATIO (ref 0–5)
CO2 SERPL-SCNC: 25 MMOL/L (ref 20–29)
CREAT SERPL-MCNC: 1.14 MG/DL (ref 0.76–1.27)
EGFRCR SERPLBLD CKD-EPI 2021: 67 ML/MIN/1.73
GLOBULIN SER CALC-MCNC: 2.1 G/DL (ref 1.5–4.5)
GLUCOSE SERPL-MCNC: 102 MG/DL (ref 70–99)
HDLC SERPL-MCNC: 66 MG/DL
LDLC SERPL CALC-MCNC: 98 MG/DL (ref 0–99)
POTASSIUM SERPL-SCNC: 4.6 MMOL/L (ref 3.5–5.2)
PROT SERPL-MCNC: 6.4 G/DL (ref 6–8.5)
SODIUM SERPL-SCNC: 141 MMOL/L (ref 134–144)
TRIGL SERPL-MCNC: 117 MG/DL (ref 0–149)
VLDLC SERPL CALC-MCNC: 21 MG/DL (ref 5–40)

## 2023-04-03 DIAGNOSIS — K64.1 GRADE II HEMORRHOIDS: ICD-10-CM

## 2023-04-04 RX ORDER — HYDROCORTISONE 25 MG/G
CREAM TOPICAL
Qty: 30 G | Refills: 0 | Status: SHIPPED | OUTPATIENT
Start: 2023-04-04

## 2023-06-14 ENCOUNTER — TELEPHONE (OUTPATIENT)
Dept: FAMILY MEDICINE CLINIC | Facility: CLINIC | Age: 78
End: 2023-06-14
Payer: MEDICARE

## 2023-06-14 DIAGNOSIS — I10 ESSENTIAL HYPERTENSION: ICD-10-CM

## 2023-06-14 DIAGNOSIS — E78.2 MIXED HYPERLIPIDEMIA: Primary | ICD-10-CM

## 2024-01-25 PROBLEM — Z86.010 PERSONAL HISTORY OF COLONIC POLYPS  Z86.010: Status: ACTIVE | Noted: 2018-09-06

## 2024-02-27 ENCOUNTER — ON CAMPUS - OUTPATIENT (AMBULATORY)
Dept: URBAN - METROPOLITAN AREA HOSPITAL 2 | Facility: HOSPITAL | Age: 79
End: 2024-02-27
Payer: OTHER GOVERNMENT

## 2024-02-27 VITALS
DIASTOLIC BLOOD PRESSURE: 98 MMHG | OXYGEN SATURATION: 99 % | DIASTOLIC BLOOD PRESSURE: 70 MMHG | RESPIRATION RATE: 17 BRPM | HEART RATE: 75 BPM | DIASTOLIC BLOOD PRESSURE: 58 MMHG | SYSTOLIC BLOOD PRESSURE: 113 MMHG | HEART RATE: 73 BPM | SYSTOLIC BLOOD PRESSURE: 115 MMHG | SYSTOLIC BLOOD PRESSURE: 133 MMHG | HEART RATE: 76 BPM | HEART RATE: 66 BPM | SYSTOLIC BLOOD PRESSURE: 105 MMHG | WEIGHT: 158 LBS | HEIGHT: 69 IN | OXYGEN SATURATION: 96 % | TEMPERATURE: 97.5 F | DIASTOLIC BLOOD PRESSURE: 61 MMHG | RESPIRATION RATE: 16 BRPM | HEART RATE: 68 BPM | OXYGEN SATURATION: 98 % | DIASTOLIC BLOOD PRESSURE: 67 MMHG | DIASTOLIC BLOOD PRESSURE: 68 MMHG | OXYGEN SATURATION: 97 % | DIASTOLIC BLOOD PRESSURE: 63 MMHG | SYSTOLIC BLOOD PRESSURE: 117 MMHG | HEART RATE: 84 BPM | SYSTOLIC BLOOD PRESSURE: 181 MMHG | SYSTOLIC BLOOD PRESSURE: 140 MMHG | SYSTOLIC BLOOD PRESSURE: 119 MMHG

## 2024-02-27 DIAGNOSIS — Z09 ENCOUNTER FOR FOLLOW-UP EXAMINATION AFTER COMPLETED TREATMEN: ICD-10-CM

## 2024-02-27 DIAGNOSIS — Z86.010 PERSONAL HISTORY OF COLONIC POLYPS: ICD-10-CM

## 2024-02-27 DIAGNOSIS — K57.30 DIVERTICULOSIS OF LARGE INTESTINE WITHOUT PERFORATION OR ABS: ICD-10-CM

## 2024-02-27 PROCEDURE — 45378 DIAGNOSTIC COLONOSCOPY: CPT | Mod: 33 | Performed by: INTERNAL MEDICINE

## 2024-09-06 ENCOUNTER — APPOINTMENT (OUTPATIENT)
Dept: NUCLEAR MEDICINE | Facility: HOSPITAL | Age: 79
End: 2024-09-06
Payer: MEDICARE

## 2024-09-06 ENCOUNTER — APPOINTMENT (OUTPATIENT)
Dept: GENERAL RADIOLOGY | Facility: HOSPITAL | Age: 79
End: 2024-09-06
Payer: MEDICARE

## 2024-09-06 ENCOUNTER — HOSPITAL ENCOUNTER (OUTPATIENT)
Facility: HOSPITAL | Age: 79
Setting detail: OBSERVATION
Discharge: HOME | End: 2024-09-06
Attending: EMERGENCY MEDICINE | Admitting: EMERGENCY MEDICINE
Payer: MEDICARE

## 2024-09-06 ENCOUNTER — APPOINTMENT (OUTPATIENT)
Dept: CT IMAGING | Facility: HOSPITAL | Age: 79
End: 2024-09-06
Payer: MEDICARE

## 2024-09-06 ENCOUNTER — READMISSION MANAGEMENT (OUTPATIENT)
Dept: CALL CENTER | Facility: HOSPITAL | Age: 79
End: 2024-09-06
Payer: MEDICARE

## 2024-09-06 VITALS
TEMPERATURE: 98.2 F | BODY MASS INDEX: 24.22 KG/M2 | HEART RATE: 71 BPM | RESPIRATION RATE: 16 BRPM | SYSTOLIC BLOOD PRESSURE: 145 MMHG | OXYGEN SATURATION: 99 % | HEIGHT: 68 IN | DIASTOLIC BLOOD PRESSURE: 86 MMHG | WEIGHT: 159.83 LBS

## 2024-09-06 DIAGNOSIS — R07.9 CHEST PAIN, UNSPECIFIED TYPE: Primary | ICD-10-CM

## 2024-09-06 LAB
ALBUMIN SERPL-MCNC: 4 G/DL (ref 3.5–5.2)
ALBUMIN/GLOB SERPL: 1.4 G/DL
ALP SERPL-CCNC: 63 U/L (ref 39–117)
ALT SERPL W P-5'-P-CCNC: 25 U/L (ref 1–41)
ANION GAP SERPL CALCULATED.3IONS-SCNC: 8.9 MMOL/L (ref 5–15)
AST SERPL-CCNC: 30 U/L (ref 1–40)
BASOPHILS # BLD AUTO: 0.1 10*3/MM3 (ref 0–0.2)
BASOPHILS NFR BLD AUTO: 1.2 % (ref 0–1.5)
BH CV NUCLEAR PRIOR STUDY: 3
BH CV REST NUCLEAR ISOTOPE DOSE: 9.4 MCI
BH CV STRESS BP STAGE 1: NORMAL
BH CV STRESS BP STAGE 2: NORMAL
BH CV STRESS BP STAGE 3: NORMAL
BH CV STRESS DURATION MIN STAGE 1: 3
BH CV STRESS DURATION MIN STAGE 2: 3
BH CV STRESS DURATION MIN STAGE 3: 3
BH CV STRESS DURATION SEC STAGE 1: 0
BH CV STRESS DURATION SEC STAGE 2: 0
BH CV STRESS DURATION SEC STAGE 3: 0
BH CV STRESS GRADE STAGE 1: 10
BH CV STRESS GRADE STAGE 2: 12
BH CV STRESS GRADE STAGE 3: 14
BH CV STRESS HR STAGE 1: 117
BH CV STRESS HR STAGE 2: 140
BH CV STRESS HR STAGE 3: 140
BH CV STRESS METS STAGE 1: 5
BH CV STRESS METS STAGE 2: 7.5
BH CV STRESS METS STAGE 3: 10
BH CV STRESS NUCLEAR ISOTOPE DOSE: 33 MCI
BH CV STRESS PROTOCOL 1: NORMAL
BH CV STRESS RECOVERY BP: NORMAL MMHG
BH CV STRESS RECOVERY HR: 95 BPM
BH CV STRESS SPEED STAGE 1: 1.7
BH CV STRESS SPEED STAGE 2: 2.5
BH CV STRESS SPEED STAGE 3: 3.4
BH CV STRESS STAGE 1: 1
BH CV STRESS STAGE 2: 2
BH CV STRESS STAGE 3: 3
BILIRUB SERPL-MCNC: 0.5 MG/DL (ref 0–1.2)
BUN SERPL-MCNC: 19 MG/DL (ref 8–23)
BUN/CREAT SERPL: 17.8 (ref 7–25)
CALCIUM SPEC-SCNC: 9.5 MG/DL (ref 8.6–10.5)
CHLORIDE SERPL-SCNC: 105 MMOL/L (ref 98–107)
CHOLEST SERPL-MCNC: 171 MG/DL (ref 0–200)
CO2 SERPL-SCNC: 26.1 MMOL/L (ref 22–29)
CREAT SERPL-MCNC: 1.07 MG/DL (ref 0.76–1.27)
D DIMER PPP FEU-MCNC: 1.2 MG/L (FEU) (ref 0–0.77)
DEPRECATED RDW RBC AUTO: 49 FL (ref 37–54)
EGFRCR SERPLBLD CKD-EPI 2021: 71.5 ML/MIN/1.73
EOSINOPHIL # BLD AUTO: 0.4 10*3/MM3 (ref 0–0.4)
EOSINOPHIL NFR BLD AUTO: 4.9 % (ref 0.3–6.2)
ERYTHROCYTE [DISTWIDTH] IN BLOOD BY AUTOMATED COUNT: 15.2 % (ref 12.3–15.4)
GEN 5 2HR TROPONIN T REFLEX: 23 NG/L
GLOBULIN UR ELPH-MCNC: 2.9 GM/DL
GLUCOSE SERPL-MCNC: 104 MG/DL (ref 65–99)
HCT VFR BLD AUTO: 44.1 % (ref 37.5–51)
HDLC SERPL-MCNC: 51 MG/DL (ref 40–60)
HGB BLD-MCNC: 14.1 G/DL (ref 13–17.7)
HOLD SPECIMEN: NORMAL
HOLD SPECIMEN: NORMAL
IMM GRANULOCYTES # BLD AUTO: 0.02 10*3/MM3 (ref 0–0.05)
IMM GRANULOCYTES NFR BLD AUTO: 0.2 % (ref 0–0.5)
LDLC SERPL CALC-MCNC: 91 MG/DL (ref 0–100)
LDLC/HDLC SERPL: 1.69 {RATIO}
LIPASE SERPL-CCNC: 84 U/L (ref 13–60)
LV EF NUC BP: 76 %
LYMPHOCYTES # BLD AUTO: 2.97 10*3/MM3 (ref 0.7–3.1)
LYMPHOCYTES NFR BLD AUTO: 36.2 % (ref 19.6–45.3)
MAGNESIUM SERPL-MCNC: 2.2 MG/DL (ref 1.6–2.4)
MAXIMAL PREDICTED HEART RATE: 143 BPM
MCH RBC QN AUTO: 27.9 PG (ref 26.6–33)
MCHC RBC AUTO-ENTMCNC: 32 G/DL (ref 31.5–35.7)
MCV RBC AUTO: 87.3 FL (ref 79–97)
MONOCYTES # BLD AUTO: 0.93 10*3/MM3 (ref 0.1–0.9)
MONOCYTES NFR BLD AUTO: 11.3 % (ref 5–12)
NEUTROPHILS NFR BLD AUTO: 3.78 10*3/MM3 (ref 1.7–7)
NEUTROPHILS NFR BLD AUTO: 46.2 % (ref 42.7–76)
NRBC BLD AUTO-RTO: 0 /100 WBC (ref 0–0.2)
PERCENT MAX PREDICTED HR: 97.9 %
PLATELET # BLD AUTO: 256 10*3/MM3 (ref 140–450)
PMV BLD AUTO: 8.6 FL (ref 6–12)
POTASSIUM SERPL-SCNC: 4.1 MMOL/L (ref 3.5–5.2)
PROT SERPL-MCNC: 6.9 G/DL (ref 6–8.5)
RBC # BLD AUTO: 5.05 10*6/MM3 (ref 4.14–5.8)
SODIUM SERPL-SCNC: 140 MMOL/L (ref 136–145)
STRESS BASELINE BP: NORMAL MMHG
STRESS BASELINE HR: 85 BPM
STRESS PERCENT HR: 115 %
STRESS POST ESTIMATED WORKLOAD: 7.1 METS
STRESS POST EXERCISE DUR MIN: 6 MIN
STRESS POST EXERCISE DUR SEC: 1 SEC
STRESS POST PEAK BP: NORMAL MMHG
STRESS POST PEAK HR: 140 BPM
STRESS TARGET HR: 122 BPM
T4 FREE SERPL-MCNC: 1 NG/DL (ref 0.93–1.7)
TRIGL SERPL-MCNC: 168 MG/DL (ref 0–150)
TROPONIN T DELTA: 0 NG/L
TROPONIN T SERPL HS-MCNC: 23 NG/L
TSH SERPL DL<=0.05 MIU/L-ACNC: 3.83 UIU/ML (ref 0.27–4.2)
VLDLC SERPL-MCNC: 29 MG/DL (ref 5–40)
WBC NRBC COR # BLD AUTO: 8.2 10*3/MM3 (ref 3.4–10.8)
WHOLE BLOOD HOLD COAG: NORMAL
WHOLE BLOOD HOLD SPECIMEN: NORMAL

## 2024-09-06 PROCEDURE — 84484 ASSAY OF TROPONIN QUANT: CPT | Performed by: EMERGENCY MEDICINE

## 2024-09-06 PROCEDURE — 71275 CT ANGIOGRAPHY CHEST: CPT

## 2024-09-06 PROCEDURE — G0378 HOSPITAL OBSERVATION PER HR: HCPCS

## 2024-09-06 PROCEDURE — 36415 COLL VENOUS BLD VENIPUNCTURE: CPT

## 2024-09-06 PROCEDURE — 80053 COMPREHEN METABOLIC PANEL: CPT | Performed by: EMERGENCY MEDICINE

## 2024-09-06 PROCEDURE — A9502 TC99M TETROFOSMIN: HCPCS | Performed by: EMERGENCY MEDICINE

## 2024-09-06 PROCEDURE — 93005 ELECTROCARDIOGRAM TRACING: CPT | Performed by: EMERGENCY MEDICINE

## 2024-09-06 PROCEDURE — 85025 COMPLETE CBC W/AUTO DIFF WBC: CPT | Performed by: EMERGENCY MEDICINE

## 2024-09-06 PROCEDURE — 99285 EMERGENCY DEPT VISIT HI MDM: CPT

## 2024-09-06 PROCEDURE — 93005 ELECTROCARDIOGRAM TRACING: CPT

## 2024-09-06 PROCEDURE — 78452 HT MUSCLE IMAGE SPECT MULT: CPT

## 2024-09-06 PROCEDURE — 83690 ASSAY OF LIPASE: CPT | Performed by: EMERGENCY MEDICINE

## 2024-09-06 PROCEDURE — 78452 HT MUSCLE IMAGE SPECT MULT: CPT | Performed by: INTERNAL MEDICINE

## 2024-09-06 PROCEDURE — 0 TECHNETIUM TETROFOSMIN KIT: Performed by: EMERGENCY MEDICINE

## 2024-09-06 PROCEDURE — 80061 LIPID PANEL: CPT | Performed by: PHYSICIAN ASSISTANT

## 2024-09-06 PROCEDURE — 84439 ASSAY OF FREE THYROXINE: CPT | Performed by: EMERGENCY MEDICINE

## 2024-09-06 PROCEDURE — 25510000001 IOPAMIDOL PER 1 ML: Performed by: EMERGENCY MEDICINE

## 2024-09-06 PROCEDURE — 93018 CV STRESS TEST I&R ONLY: CPT | Performed by: INTERNAL MEDICINE

## 2024-09-06 PROCEDURE — 71045 X-RAY EXAM CHEST 1 VIEW: CPT

## 2024-09-06 PROCEDURE — 93017 CV STRESS TEST TRACING ONLY: CPT

## 2024-09-06 PROCEDURE — 83735 ASSAY OF MAGNESIUM: CPT | Performed by: EMERGENCY MEDICINE

## 2024-09-06 PROCEDURE — 84443 ASSAY THYROID STIM HORMONE: CPT | Performed by: EMERGENCY MEDICINE

## 2024-09-06 PROCEDURE — 85379 FIBRIN DEGRADATION QUANT: CPT | Performed by: EMERGENCY MEDICINE

## 2024-09-06 PROCEDURE — 99204 OFFICE O/P NEW MOD 45 MIN: CPT | Performed by: INTERNAL MEDICINE

## 2024-09-06 RX ORDER — AMOXICILLIN 250 MG
2 CAPSULE ORAL 2 TIMES DAILY PRN
Status: DISCONTINUED | OUTPATIENT
Start: 2024-09-06 | End: 2024-09-06 | Stop reason: HOSPADM

## 2024-09-06 RX ORDER — ASPIRIN 81 MG/1
81 TABLET ORAL DAILY
Status: DISCONTINUED | OUTPATIENT
Start: 2024-09-07 | End: 2024-09-06 | Stop reason: HOSPADM

## 2024-09-06 RX ORDER — BISACODYL 5 MG/1
5 TABLET, DELAYED RELEASE ORAL DAILY PRN
Status: DISCONTINUED | OUTPATIENT
Start: 2024-09-06 | End: 2024-09-06 | Stop reason: HOSPADM

## 2024-09-06 RX ORDER — NITROGLYCERIN 0.4 MG/1
0.4 TABLET SUBLINGUAL
Status: DISCONTINUED | OUTPATIENT
Start: 2024-09-06 | End: 2024-09-06 | Stop reason: HOSPADM

## 2024-09-06 RX ORDER — ASPIRIN 81 MG/1
324 TABLET, CHEWABLE ORAL ONCE
Status: COMPLETED | OUTPATIENT
Start: 2024-09-06 | End: 2024-09-06

## 2024-09-06 RX ORDER — LISINOPRIL 20 MG/1
10 TABLET ORAL DAILY
COMMUNITY

## 2024-09-06 RX ORDER — BISACODYL 10 MG
10 SUPPOSITORY, RECTAL RECTAL DAILY PRN
Status: DISCONTINUED | OUTPATIENT
Start: 2024-09-06 | End: 2024-09-06 | Stop reason: HOSPADM

## 2024-09-06 RX ORDER — IOPAMIDOL 755 MG/ML
100 INJECTION, SOLUTION INTRAVASCULAR
Status: COMPLETED | OUTPATIENT
Start: 2024-09-06 | End: 2024-09-06

## 2024-09-06 RX ORDER — SODIUM CHLORIDE 0.9 % (FLUSH) 0.9 %
10 SYRINGE (ML) INJECTION EVERY 12 HOURS SCHEDULED
Status: DISCONTINUED | OUTPATIENT
Start: 2024-09-06 | End: 2024-09-06 | Stop reason: HOSPADM

## 2024-09-06 RX ORDER — SODIUM CHLORIDE 0.9 % (FLUSH) 0.9 %
10 SYRINGE (ML) INJECTION AS NEEDED
Status: DISCONTINUED | OUTPATIENT
Start: 2024-09-06 | End: 2024-09-06 | Stop reason: HOSPADM

## 2024-09-06 RX ORDER — SIMVASTATIN 40 MG
40 TABLET ORAL DAILY
COMMUNITY

## 2024-09-06 RX ORDER — ONDANSETRON 4 MG/1
4 TABLET, ORALLY DISINTEGRATING ORAL EVERY 6 HOURS PRN
Status: DISCONTINUED | OUTPATIENT
Start: 2024-09-06 | End: 2024-09-06 | Stop reason: HOSPADM

## 2024-09-06 RX ORDER — POLYETHYLENE GLYCOL 3350 17 G/17G
17 POWDER, FOR SOLUTION ORAL DAILY PRN
Status: DISCONTINUED | OUTPATIENT
Start: 2024-09-06 | End: 2024-09-06 | Stop reason: HOSPADM

## 2024-09-06 RX ORDER — ONDANSETRON 2 MG/ML
4 INJECTION INTRAMUSCULAR; INTRAVENOUS EVERY 6 HOURS PRN
Status: DISCONTINUED | OUTPATIENT
Start: 2024-09-06 | End: 2024-09-06 | Stop reason: HOSPADM

## 2024-09-06 RX ORDER — ALUMINA, MAGNESIA, AND SIMETHICONE 2400; 2400; 240 MG/30ML; MG/30ML; MG/30ML
15 SUSPENSION ORAL EVERY 6 HOURS PRN
Status: DISCONTINUED | OUTPATIENT
Start: 2024-09-06 | End: 2024-09-06 | Stop reason: HOSPADM

## 2024-09-06 RX ORDER — SODIUM CHLORIDE 9 MG/ML
40 INJECTION, SOLUTION INTRAVENOUS AS NEEDED
Status: DISCONTINUED | OUTPATIENT
Start: 2024-09-06 | End: 2024-09-06 | Stop reason: HOSPADM

## 2024-09-06 RX ORDER — ENOXAPARIN SODIUM 100 MG/ML
40 INJECTION SUBCUTANEOUS DAILY
Status: DISCONTINUED | OUTPATIENT
Start: 2024-09-06 | End: 2024-09-06 | Stop reason: HOSPADM

## 2024-09-06 RX ADMIN — Medication 10 ML: at 13:22

## 2024-09-06 RX ADMIN — IOPAMIDOL 100 ML: 755 INJECTION, SOLUTION INTRAVENOUS at 08:52

## 2024-09-06 RX ADMIN — TETROFOSMIN 1 DOSE: 1.38 INJECTION, POWDER, LYOPHILIZED, FOR SOLUTION INTRAVENOUS at 14:05

## 2024-09-06 RX ADMIN — ASPIRIN 81 MG CHEWABLE TABLET 324 MG: 81 TABLET CHEWABLE at 08:00

## 2024-09-06 RX ADMIN — TETROFOSMIN 1 DOSE: 1.38 INJECTION, POWDER, LYOPHILIZED, FOR SOLUTION INTRAVENOUS at 14:35

## 2024-09-06 NOTE — ED PROVIDER NOTES
"Subjective   History of Present Illness  79-year-old male presents for chest pain.  Right-sided.  It radiated down right arm.  Lasted approximately 10 to 15 minutes.  Denies shortness of breath, diaphoresis, nausea and vomiting.  Denies cardiac history.  Non-smoker.  States has had similar symptoms happen once before and per review of notes patient was diagnosed with aspiration pneumonia.  Review of Systems  See HPI.  Past Medical History:   Diagnosis Date    Hyperlipidemia 11/14/2019    Hypertension        Allergies   Allergen Reactions    Bee Venom Anaphylaxis    Penicillins GI Intolerance    Sulfa Antibiotics Hives       Past Surgical History:   Procedure Laterality Date    BACK SURGERY      CATARACT EXTRACTION         Family History   Problem Relation Age of Onset    Cancer Mother     Cancer Father     Heart disease Father     Hypertension Father        Social History     Socioeconomic History    Marital status:    Tobacco Use    Smoking status: Never     Passive exposure: Never    Smokeless tobacco: Never   Vaping Use    Vaping status: Never Used   Substance and Sexual Activity    Alcohol use: No    Drug use: No    Sexual activity: Defer           Objective   Physical Exam  No acute distress, regular rate and rhythm, no tachypnea or increased work of breathing, no murmur appreciated, abdomen soft nontender, intact distal pulses, alert.  Procedures           ED Course      /86 (BP Location: Right arm, Patient Position: Sitting)   Pulse 71   Temp 98.2 °F (36.8 °C) (Oral)   Resp 16   Ht 172.7 cm (68\")   Wt 72.5 kg (159 lb 13.3 oz)   SpO2 99%   BMI 24.30 kg/m²   Labs Reviewed   COMPREHENSIVE METABOLIC PANEL - Abnormal; Notable for the following components:       Result Value    Glucose 104 (*)     All other components within normal limits    Narrative:     GFR Normal >60  Chronic Kidney Disease <60  Kidney Failure <15    The GFR formula is only valid for adults with stable renal function between " "ages 18 and 70.   TROPONIN - Abnormal; Notable for the following components:    HS Troponin T 23 (*)     All other components within normal limits    Narrative:     High Sensitive Troponin T Reference Range:  <14.0 ng/L- Negative Female for AMI  <22.0 ng/L- Negative Male for AMI  >=14 - Abnormal Female indicating possible myocardial injury.  >=22 - Abnormal Male indicating possible myocardial injury.   Clinicians would have to utilize clinical acumen, EKG, Troponin, and serial changes to determine if it is an Acute Myocardial Infarction or myocardial injury due to an underlying chronic condition.        D-DIMER, QUANTITATIVE - Abnormal; Notable for the following components:    D-Dimer, Quantitative 1.20 (*)     All other components within normal limits    Narrative:     According to the assay 's published package insert, a normal (<0.50 mg/L (FEU)) D-dimer result in conjunction with a non-high clinical probability assessment, excludes deep vein thrombosis (DVT) and pulmonary embolism (PE) with high sensitivity.    D-dimer values increase with age and this can make VTE exclusion of an older population difficult. To address this, the [employer], based on best available evidence and recent guidelines, recommends that clinicians use age-adjusted D-dimer thresholds in patients greater than 50 years of age with: a) a low probability of PE who do not meet all Pulmonary Embolism Rule Out Criteria, or b) in those with intermediate probability of PE.   The formula for an age-adjusted D-dimer cut-off is \"age/100\".  For example, a 60 year old patient would have an age-adjusted cut-off of 0.60 mg/L (FEU) and an 80 year old 0.80 mg/L (FEU).   LIPASE - Abnormal; Notable for the following components:    Lipase 84 (*)     All other components within normal limits   CBC WITH AUTO DIFFERENTIAL - Abnormal; Notable for the following components:    Monocytes, Absolute 0.93 (*)     All other components " within normal limits   HIGH SENSITIVITIY TROPONIN T 2HR - Abnormal; Notable for the following components:    HS Troponin T 23 (*)     All other components within normal limits    Narrative:     High Sensitive Troponin T Reference Range:  <14.0 ng/L- Negative Female for AMI  <22.0 ng/L- Negative Male for AMI  >=14 - Abnormal Female indicating possible myocardial injury.  >=22 - Abnormal Male indicating possible myocardial injury.   Clinicians would have to utilize clinical acumen, EKG, Troponin, and serial changes to determine if it is an Acute Myocardial Infarction or myocardial injury due to an underlying chronic condition.        LIPID PANEL - Abnormal; Notable for the following components:    Triglycerides 168 (*)     All other components within normal limits    Narrative:     Cholesterol Reference Ranges  (U.S. Department of Health and Human Services ATP III Classifications)    Desirable          <200 mg/dL  Borderline High    200-239 mg/dL  High Risk          >240 mg/dL      Triglyceride Reference Ranges  (U.S. Department of Health and Human Services ATP III Classifications)    Normal           <150 mg/dL  Borderline High  150-199 mg/dL  High             200-499 mg/dL  Very High        >500 mg/dL    HDL Reference Ranges  (U.S. Department of Health and Human Services ATP III Classifications)    Low     <40 mg/dl (major risk factor for CHD)  High    >60 mg/dl ('negative' risk factor for CHD)        LDL Reference Ranges  (U.S. Department of Health and Human Services ATP III Classifications)    Optimal          <100 mg/dL  Near Optimal     100-129 mg/dL  Borderline High  130-159 mg/dL  High             160-189 mg/dL  Very High        >189 mg/dL   MAGNESIUM - Normal   TSH - Normal   T4, FREE - Normal   RAINBOW DRAW    Narrative:     The following orders were created for panel order [place] Draw.  Procedure                               Abnormality         Status                     ---------                                -----------         ------                     Green Top (Gel)[[id number]]                                  Final result               Lavender Top[[id number]]                                     Final result               Gold Top - SST[[id number]]                                   Final result               Light Blue Top[[id number]]                                   Final result                 Please view results for these tests on the individual orders.   GREEN TOP   LAVENDER TOP   GOLD TOP - SST   LIGHT BLUE TOP   CBC AND DIFFERENTIAL    Narrative:     The following orders were created for panel order CBC & Differential.  Procedure                               Abnormality         Status                     ---------                               -----------         ------                     CBC Auto Differential[[id number]]        Abnormal            Final result                 Please view results for these tests on the individual orders.     Medications   aspirin chewable tablet 324 mg (324 mg Oral Given 9/6/24 0800)   iopamidol (ISOVUE-370) 76 % injection 100 mL (100 mL Intravenous Given 9/6/24 0852)   technetium tetrofosmin (Tc-MYOVIEW) injection 1 dose (1 dose Intravenous Given 9/6/24 1405)   technetium tetrofosmin (Tc-MYOVIEW) injection 1 dose (1 dose Intravenous Given 9/6/24 1435)     CT Angiogram Chest Pulmonary Embolism    Result Date: 9/6/2024  Impression: 1. No evidence of pulmonary embolus or other acute process within the chest. 2. Minimal bilateral lower lobe and left upper lobe atelectasis. Electronically Signed: Jerome Khoury MD  9/6/2024 9:18 AM EDT  Workstation ID: [id number]    XR Chest 1 View    Result Date: 9/6/2024  Impression: 1.Bibasilar densities which could reflect some atelectasis. Electronically Signed: Danielito Leggett MD  9/6/2024 8:15 AM EDT  Workstation ID: [id number]             HEART Score: 5                              Medical Decision Making  Problems Addressed:  Chest pain,  unspecified type: complicated acute illness or injury    Amount and/or Complexity of Data Reviewed  Labs: ordered.  Radiology: ordered.  ECG/medicine tests: ordered.    Risk  OTC drugs.  Prescription drug management.  Decision regarding hospitalization.    EKG interpretation: 7:26 AM, rate 65, normal sinus rhythm, normal axis, normal intervals, no acute ischemic changes.    EKG interpretation #2: 7:59 AM, rate 62, normal sinus rhythm, normal axis, normal intervals, no acute ischemic changes, more pronounced T waves in multiple leads when compared to EKG from approximately 30 minutes prior.    Patient had episode where he bradycardia down to heart rate of near 40 and became symptomatic again.  Stat EKG was obtained but patient's heart rate did already come back up.        Final diagnoses:   Chest pain, unspecified type       ED Disposition  ED Disposition       ED Disposition   Decision to Admit    Condition   --    Comment   --               Parvin Villatoro, APRN  [address] [place] [address] DR HUI MCCLELLAND  [employer] [place] [facility]  [phone]      5 to 7 days         Medication List      No changes were made to your prescriptions during this visit.            Pascual Foote MD  09/07/24 0830

## 2024-09-06 NOTE — CONSULTS
Cardiology Consult-EP      REQUESTING PROVIDER  Pascual Foote MD    PATIENT IDENTIFICATION  Name: Beck Sears  Age: 79 y.o.  Sex: male  :  1945  MRN: [id number]             REASON  FOR  CONSULTATION  79-year-old male with no known history of ischemic heart disease.  Patient reports having nuclear stress testing approximately 15-20 years ago that was normal.  Past medical history includes dyslipidemia.    CC:  Chest pain    HPI:  Patient presented to the emergency department at [facility] 2024 after awakening with right lateral chest discomfort described as a slight ache, mild tightness.  He reports some mild numbness in his right arm as well.  This lasted approximately 15-20 minutes and subsided.  He denies any associated symptoms such as shortness of breath, palpitations, diaphoresis, dizziness, lightheadedness, orthopnea, lower extremity edema.  He has had no further discomfort since arrival.  In the ED, workup was initiated to include serial cardiac enzymes at 23 x 2.  All other labs normal.  Chest x-ray with bibasilar densities which could reflect some atelectasis.  CT angiogram chest with no evidence of PE.  Minimal bilateral lower lobe and left upper lobe atelectasis.  Cardiology was asked to consult for further evaluation of chest pain.    Upon my evaluation, patient is sitting in bedside chair and appears quite comfortable.  He denies any further symptoms.  No focal deficits.      REVIEW OF SYSTEMS:  Pertinent items are noted in HPI, all other systems reviewed and negative    OBJECTIVE   Troponin 23 x 2  Triglycerides 168    ASSESSMENT  Chest pain-atypical  Mild troponin elevation without trending  History of dyslipidemia    RECOMMENDATIONS  The patient has had very slight troponin elevation without trending at 23 x 2 total.  His symptoms are very atypical-right-sided, mild, occurring at rest.  He is a very active 79-year-old male with no significant risk  "factors.  We will proceed with exercise nuclear stress test to further risk stratify.  If no evidence of ischemia noted, patient may be discharged home and follow-up with primary care.          CHF Guideline Directed Medical Therapy  Beta Blocker:   ARNI/ACE/ARB:   SGLT 2 inhibitors:   Diuretics:   Aldosterone Antagonist:   Vasodilators & Nitrates:       Vital Signs  Visit Vitals  /75 (BP Location: Right arm, Patient Position: Lying)   Pulse 60   Temp 97.5 °F (36.4 °C) (Oral)   Resp 16   Ht 172.7 cm (68\")   Wt 72.5 kg (159 lb 13.3 oz)   SpO2 98%   BMI 24.30 kg/m²     Oxygen Therapy  SpO2: 98 %  Flowsheet Rows      Flowsheet Row First Filed Value   Admission Height 172.7 cm (68\") Documented at 09/06/2024 0720   Admission Weight 72.5 kg (159 lb 13.3 oz) Documented at 09/06/2024 0720          Intake & Output (last 3 days)       None          Lines, Drains & Airways       Active LDAs       Name Placement date Placement time Site Days    Peripheral IV 09/06/24 0752 Anterior;Distal;Right;Upper Arm 09/06/24  0752  Arm  less than 1                    MEDICAL HISTORY    Past Medical History:   Diagnosis Date    Hyperlipidemia 11/14/2019    Hypertension         SURGICAL HISTORY    Past Surgical History:   Procedure Laterality Date    BACK SURGERY      CATARACT EXTRACTION          FAMILY HISTORY    Family History   Problem Relation Age of Onset    Cancer Mother     Cancer Father     Heart disease Father     Hypertension Father        SOCIAL HISTORY    Social History     Tobacco Use    Smoking status: Never     Passive exposure: Never    Smokeless tobacco: Never   Substance Use Topics    Alcohol use: No        ALLERGIES    Allergies   Allergen Reactions    Bee Venom Anaphylaxis    Penicillins GI Intolerance    Sulfa Antibiotics Hives              /75 (BP Location: Right arm, Patient Position: Lying)   Pulse 60   Temp 97.5 °F (36.4 °C) (Oral)   Resp 16   Ht 172.7 cm (68\")   Wt 72.5 kg (159 lb 13.3 oz)   SpO2 " 98%   BMI 24.30 kg/m²   Intake/Output last 3 shifts:  No intake/output data recorded.  Intake/Output this shift:  No intake/output data recorded.    PHYSICAL EXAM:    General: Alert, cooperative, no distress, appears stated age  Head:  Normocephalic, atraumatic, mucous membranes moist  Eyes:  Conjunctivae/corneas clear, EOM's intact     Neck:  Supple,  no adenopathy; no JVD or bruit  Lungs:  Clear to auscultation bilaterally, no wheezes, rhonchi or rales are noted  Chest wall: No tenderness  Heart::  Regular rate and rhythm, S1 and S2 normal, no murmur, rub or gallop  Abdomen: Soft, nontender, nondistended, bowel sounds active  Extremities: No cyanosis, clubbing, or edema   Pulses: 2+ and symmetric all extremities  Skin:  No rashes or lesions  Neuro/psych: A&O x3. CN II through XII are grossly intact with appropriate affect    Scheduled Meds:      [START ON 9/7/2024] aspirin, 81 mg, Oral, Daily  enoxaparin, 40 mg, Subcutaneous, Daily  sodium chloride, 10 mL, Intravenous, Q12H        Continuous Infusions:         PRN Meds:      aluminum-magnesium hydroxide-simethicone    senna-docusate sodium **AND** polyethylene glycol **AND** bisacodyl **AND** bisacodyl    Calcium Replacement - Follow Nurse / BPA Driven Protocol    Magnesium Standard Dose Replacement - Follow Nurse / BPA Driven Protocol    melatonin    nitroglycerin    ondansetron ODT **OR** ondansetron    Phosphorus Replacement - Follow Nurse / BPA Driven Protocol    Potassium Replacement - Follow Nurse / BPA Driven Protocol    [COMPLETED] Insert Peripheral IV **AND** sodium chloride    sodium chloride    sodium chloride     Data Review:     Results Review:     I reviewed the patient's new clinical results.    CBC    Results from last 7 days   Lab Units 09/06/24  0745   WBC 10*3/mm3 8.20   HEMOGLOBIN g/dL 14.1   PLATELETS 10*3/mm3 256     Cr Clearance Estimated Creatinine Clearance: 59.3 mL/min (by C-G formula based on SCr of 1.07 mg/dL).  Coag     HbA1C   Lab  "Results   Component Value Date    HGBA1C 5.8 07/20/2022     Blood Glucose No results found for: \"POCGLU\"  Infection     CMP   Results from last 7 days   Lab Units 09/06/24  0745   SODIUM mmol/L 140   POTASSIUM mmol/L 4.1   CHLORIDE mmol/L 105   CO2 mmol/L 26.1   BUN mg/dL 19   CREATININE mg/dL 1.07   GLUCOSE mg/dL 104*   ALBUMIN g/dL 4.0   BILIRUBIN mg/dL 0.5   ALK PHOS U/L 63   AST (SGOT) U/L 30   ALT (SGPT) U/L 25   LIPASE U/L 84*     ABG      UA      EDOUARD  No results found for: \"POCMETH\", \"POCAMPHET\", \"POCBARBITUR\", \"POCBENZO\", \"POCCOCAINE\", \"POCOPIATES\", \"POCOXYCODO\", \"POCPHENCYC\", \"POCPROPOXY\", \"POCTHC\", \"POCTRICYC\"  Lysis Labs   Results from last 7 days   Lab Units 09/06/24  0745   HEMOGLOBIN g/dL 14.1   PLATELETS 10*3/mm3 256   CREATININE mg/dL 1.07     Radiology(recent) CT Angiogram Chest Pulmonary Embolism    Result Date: 9/6/2024  Impression: 1. No evidence of pulmonary embolus or other acute process within the chest. 2. Minimal bilateral lower lobe and left upper lobe atelectasis. Electronically Signed: Jerome Khoury MD  9/6/2024 9:18 AM EDT  Workstation ID: [id number]    XR Chest 1 View    Result Date: 9/6/2024  Impression: 1.Bibasilar densities which could reflect some atelectasis. Electronically Signed: Danielito Leggett MD  9/6/2024 8:15 AM EDT  Workstation ID: [id number]       Results from last 7 days   Lab Units 09/06/24  0935   HSTROP T ng/L 23*       X-rays, labs reviewed personally by physician.    ECG/EMG Results (most recent)       Procedure Component Value Units Date/Time    ECG 12 Lead Chest Pain [[id number]] Collected: 09/06/24 0726     Updated: 09/06/24 0727     QT Interval 395 ms      QTC Interval 411 ms     Narrative:      HEART RATE=65  bpm  RR Vgkcydik=814  ms  NJ Cknibziv=570  ms  P Horizontal Axis=-16  deg  P Front Axis=68  deg  QRSD Interval=83  ms  QT Ujclmdyj=945  ms  LAiT=975  ms  QRS Axis=57  deg  T Wave Axis=53  deg  - ABNORMAL ECG -  Sinus rhythm  Probable anteroseptal infarct, old  Date " and Time of Study:2024-09-06 07:26:15    ECG 12 Lead Rhythm Change [[id number]] Collected: 09/06/24 0759     Updated: 09/06/24 0800     QT Interval 404 ms      QTC Interval 409 ms     Narrative:      HEART RATE=62  bpm  RR Dacegtkq=218  ms  NC Ubheapak=315  ms  P Horizontal Axis=1  deg  P Front Axis=57  deg  QRSD Interval=90  ms  QT Ddhsksdq=495  ms  VTrR=484  ms  QRS Axis=43  deg  T Wave Axis=41  deg  - NORMAL ECG -  Sinus rhythm  Date and Time of Study:2024-09-06 07:59:50    Telemetry Scan [[id number]] Resulted: 09/06/24     Updated: 09/06/24 0856    Telemetry Scan [[id number]] Resulted: 09/06/24     Updated: 09/06/24 1112              Medication Review:   I have reviewed the patient's current medication list  Scheduled Meds:[START ON 9/7/2024] aspirin, 81 mg, Oral, Daily  enoxaparin, 40 mg, Subcutaneous, Daily  sodium chloride, 10 mL, Intravenous, Q12H      Continuous Infusions:   PRN Meds:.  aluminum-magnesium hydroxide-simethicone    senna-docusate sodium **AND** polyethylene glycol **AND** bisacodyl **AND** bisacodyl    Calcium Replacement - Follow Nurse / BPA Driven Protocol    Magnesium Standard Dose Replacement - Follow Nurse / BPA Driven Protocol    melatonin    nitroglycerin    ondansetron ODT **OR** ondansetron    Phosphorus Replacement - Follow Nurse / BPA Driven Protocol    Potassium Replacement - Follow Nurse / BPA Driven Protocol    [COMPLETED] Insert Peripheral IV **AND** sodium chloride    sodium chloride    sodium chloride    Imaging:  Imaging Results (Last 72 Hours)       Procedure Component Value Units Date/Time    CT Angiogram Chest Pulmonary Embolism [[id number]] Collected: 09/06/24 0914     Updated: 09/06/24 0921    Narrative:      CT ANGIOGRAM CHEST PULMONARY EMBOLISM    Date of Exam: 9/6/2024 8:35 AM EDT    Indication: elevated dimer, R chest pain.    Comparison: 6/16/2022    Technique: Axial CT images were obtained of the chest after the uneventful intravenous administration of iodinated  "contrast utilizing pulmonary embolism protocol.  Sagittal and coronal reconstructions were performed.  Automated exposure control and   iterative reconstruction methods were used.      Findings:  Mild coronary artery calcification.    Pulmonary arteries are well-opacified. No evidence of pulmonary embolus. Thoracic aorta is of normal caliber. No evidence of aortic dissection.    No mediastinal, hilar, or axillary adenopathy. No pleural effusions.    There is dependent atelectasis within the bilateral lower lobes. There is left upper lobe atelectasis. There are no suspicious noncalcified pulmonary nodules. Bilateral adrenal glands are within normal limits.    There are multiple well-circumscribed low-density masses within the liver favored to be cyst or hemangiomas.    No lytic or sclerotic bony lesions are seen.      Impression:      Impression:    1. No evidence of pulmonary embolus or other acute process within the chest.  2. Minimal bilateral lower lobe and left upper lobe atelectasis.        Electronically Signed: Jerome Khoury MD    9/6/2024 9:18 AM EDT    Workstation ID: [id number]    XR Chest 1 View [[id number]] Collected: 09/06/24 0813     Updated: 09/06/24 0817    Narrative:      XR CHEST 1 VW    Date of Exam: 9/6/2024 8:03 AM EDT    Indication: R chest pain    Comparison: July 12, 2022    Findings:  The heart not definitely enlarged. There are some bibasilar areas of density which relate to atelectasis. There are no definite pleural effusions. Upper lungs seem clear.      Impression:      Impression:  1.Bibasilar densities which could reflect some atelectasis.      Electronically Signed: Danielito Leggett MD    9/6/2024 8:15 AM EDT    Workstation ID: [id number]              BANG Chandler  09/06/24  13:58 EDT      EMR Dragon/Transcription:   \"Dictated utilizing Dragon dictation\".              Electronically signed by BANG Chandler, 09/06/24, 1:58 PM EDT.  Copied text in this note has been " reviewed by me and is accurate as of 09/06/24.    Patient seen and examined.  Patient has a history of hypertension hyperlipidemia presented with atypical chest pain syndrome.  Underwent stress test and cardiology consultation requested.  No prior history of coronary artery disease.      Physical Exam    General:      well developed, well nourished, in no acute distress.    Head:      normocephalic and atraumatic.    Eyes:      PERRL/EOM intact, conjunctivae and sclerae clear without nystagmus.    Neck:      no  thyromegaly, trachea central with normal respiratory effort  Lungs:      clear bilaterally to auscultation.    Heart:       regular rate and rhythm, S1, S2 without murmurs, rubs, or gallops  Skin:      intact without lesions or rashes.    Psych:      alert and cooperative; normal mood and affect; normal attention span and concentration.        Atypical chest pain syndrome.  Labs x-rays EKGs reviewed.  Stress test reviewed without ischemia.  Patient can be discharged home and continue on aspirin and statins and treatment for hypertension.  Discussed with the patient and the family and the nurse taking care of the patient      Electronically signed by Brice Phillips MD, 09/06/24, 5:43 PM EDT.

## 2024-09-06 NOTE — NURSING NOTE
Cath lab called and told me that the patient was good to go from their standpoint. I messaged Dr. Phillips at [phone] to confirm if he was signing off on patient, like Vinny Menon PA-C had wanted me to do. Dr. Phillips said he is comfortable signing off on patient.

## 2024-09-06 NOTE — H&P
[facility] Observation Unit H&P    Patient Name: Beck Sears  : 1945  MRN: [id number]  Primary Care Physician: Parvin Villatoro APRN  Date of admission: 2024     Patient Care Team:  Parvin Villatoro APRN as PCP - General (Nurse Practitioner)          Subjective   History Present Illness     Chief Complaint:   Chief Complaint   Patient presents with    Chest Pain     Chest pain, dizziness right arm and chest pain started last night.           History of Present Illness  Obtained from admitting physician HPI on 2024:  79-year-old male presents for chest pain.  Right-sided.  It radiated down right arm.  Lasted approximately 10 to 15 minutes.  Denies shortness of breath, diaphoresis, nausea and vomiting.  Denies cardiac history.  Non-smoker.  States has had similar symptoms happen once before and per review of notes patient was diagnosed with aspiration pneumonia.     24 (postobservation admission):  Patient confirms the HPI noted above including being woken from sleep with some right-sided chest pain which radiated down his right arm lasting for more than 10 minutes before resolving spontaneously.  Patient then presented to the ED and was noted to have some bradycardia while he was having his IV started and subsequently developed similar right-sided chest pain as well as diaphoresis.  He denies any dyspnea, palpitations, fever, cough or changes in bowel or bladder habits.  Family history is notable for father who had a CVA.  Patient otherwise is healthy and generally active.  Patient was noted to have some pauses during auscultation of his heart at the time of my exam.  These were infrequent and he denied any associated symptoms        ROS  Review of Systems   Constitutional: Positive for diaphoresis.   HENT: Negative.     Eyes: Negative.    Cardiovascular:  Positive for chest pain and near-syncope.   Respiratory: Negative.     Skin: Negative.    Musculoskeletal: Negative.    Gastrointestinal:  Negative.    Genitourinary: Negative.    Neurological: Negative.    Psychiatric/Behavioral: Negative.         Personal History     Past Medical History:   Past Medical History:   Diagnosis Date    Hyperlipidemia 11/14/2019       Surgical History:      Past Surgical History:   Procedure Laterality Date    BACK SURGERY      CATARACT EXTRACTION             Family History: family history includes Cancer in his father and mother; Heart disease in his father; Hypertension in his father. Otherwise pertinent FHx was reviewed and unremarkable.     Social History:  reports that he has never smoked. He has never used smokeless tobacco. He reports that he does not drink alcohol and does not use drugs.      Medications:  Prior to Admission medications    Medication Sig Start Date End Date Taking? Authorizing Provider   aspirin 81 MG tablet Take 1 tablet by mouth Daily.   Yes Ephraim Thomas MD   simvastatin (ZOCOR) 40 MG tablet Take 1 tablet by mouth Daily.   Yes Ephraim Thomas MD   Cholecalciferol (Vitamin D High Potency) 25 MCG (1000 UT) capsule Take 1,000 Units by mouth Daily.    Ephraim Thomas MD   Glucosamine-Chondroit-Vit C-Mn (GLUCOSAMINE CHONDR 500 COMPLEX) capsule Take 1 capsule by mouth Daily.    Ephraim Thomas MD   ibuprofen (ADVIL,MOTRIN) 600 MG tablet Take 1 tablet by mouth Every 6 (Six) Hours As Needed for Mild Pain . 6/16/21   Charity Watts APRN   lisinopril (PRINIVIL,ZESTRIL) 10 MG tablet Take 1 tablet by mouth Daily. 12/21/22   Parvin Villatoro APRN   Multiple Vitamins-Minerals (MENS MULTI VITAMIN & MINERAL) tablet Take 1 tablet by mouth Daily.    Ephraim Thomas MD   Zinc 50 MG capsule Take 50 mg by mouth Daily.    Ephraim Thomas MD   clobetasol (TEMOVATE) 0.05 % cream Apply 1 application topically to the appropriate area as directed As Needed.  9/6/24  Ephraim Thomas MD   guaiFENesin-codeine (GUAIFENESIN AC) 100-10 MG/5ML liquid 5 cc p.o. every 4 hours as needed  cough 7/12/22 9/6/24  Reji Rosas MD   Hydrocortisone, Perianal, (ANUSOL-HC) 2.5 % rectal cream INSERT INTO THE RECTUM 2 TIMES A DAY AS NEEDED FOR HEMORRHOIDS 4/4/23 9/6/24  Parvin Villatoro APRN   melatonin 5 MG tablet tablet 5 mg Every Night. 9/17/14 9/6/24  Ephraim Thomas MD   silodosin (RAPAFLO) 8 MG capsule capsule  11/11/22 9/6/24  Ephraim Thomas MD   simvastatin (ZOCOR) 40 MG tablet Take 1 tablet by mouth Daily. 12/21/22 9/6/24  Parvin Villatoro APRN   triamcinolone (KENALOG) 0.1 % cream AMANUEL EXT TO FEET BID 8/11/20 9/6/24  ProviderEphraim MD       Allergies:    Allergies   Allergen Reactions    Bee Venom Anaphylaxis    Penicillins GI Intolerance    Sulfa Antibiotics Hives       Objective   Objective     Vital Signs  Temp:  [97.5 °F (36.4 °C)-98.3 °F (36.8 °C)] 97.5 °F (36.4 °C)  Heart Rate:  [53-72] 60  Resp:  [15-18] 16  BP: (123-168)/(71-89) 153/75  SpO2:  [93 %-98 %] 98 %  on   ;      Body mass index is 24.3 kg/m².    Physical Exam  Vitals reviewed.   Constitutional:       General: He is not in acute distress.     Appearance: Normal appearance. He is normal weight. He is not ill-appearing, toxic-appearing or diaphoretic.   HENT:      Head: Normocephalic.      Right Ear: External ear normal.      Left Ear: External ear normal.      Nose: Nose normal.      Mouth/Throat:      Mouth: Mucous membranes are moist.   Eyes:      Extraocular Movements: Extraocular movements intact.   Cardiovascular:      Rate and Rhythm: Normal rate. Rhythm irregular.      Pulses: Normal pulses.      Heart sounds: Normal heart sounds.   Pulmonary:      Effort: Pulmonary effort is normal.      Breath sounds: Normal breath sounds.   Abdominal:      General: Bowel sounds are normal.      Palpations: Abdomen is soft.      Tenderness: There is no abdominal tenderness.   Musculoskeletal:         General: Normal range of motion.      Cervical back: Normal range of motion.      Right lower leg: No edema.       Left lower leg: No edema.   Skin:     General: Skin is warm and dry.      Capillary Refill: Capillary refill takes less than 2 seconds.   Neurological:      General: No focal deficit present.      Mental Status: He is alert and oriented to person, place, and time.   Psychiatric:         Mood and Affect: Mood normal.         Behavior: Behavior normal.         Thought Content: Thought content normal.         Judgment: Judgment normal.     Results Review:  I have personally reviewed most recent cardiac tracings, lab results, and radiology images and interpretations and agree with findings, most notably: Troponin, lipase, D-dimer, lipid panel, CBC, CMP, chest x-ray, CT PE protocol and EKG.    Results from last 7 days   Lab Units 09/06/24  0745   WBC 10*3/mm3 8.20   HEMOGLOBIN g/dL 14.1   HEMATOCRIT % 44.1   PLATELETS 10*3/mm3 256     Results from last 7 days   Lab Units 09/06/24  0935 09/06/24  0745   SODIUM mmol/L  --  140   POTASSIUM mmol/L  --  4.1   CHLORIDE mmol/L  --  105   CO2 mmol/L  --  26.1   BUN mg/dL  --  19   CREATININE mg/dL  --  1.07   GLUCOSE mg/dL  --  104*   CALCIUM mg/dL  --  9.5   ALK PHOS U/L  --  63   ALT (SGPT) U/L  --  25   AST (SGOT) U/L  --  30   HSTROP T ng/L 23* 23*     Estimated Creatinine Clearance: 59.3 mL/min (by C-G formula based on SCr of 1.07 mg/dL).  Brief Urine Lab Results       None            Microbiology Results (last 10 days)       ** No results found for the last 240 hours. **            ECG/EMG Results (most recent)       Procedure Component Value Units Date/Time    ECG 12 Lead Chest Pain [[id number]] Collected: 09/06/24 0726     Updated: 09/06/24 0727     QT Interval 395 ms      QTC Interval 411 ms     Narrative:      HEART RATE=65  bpm  RR Gjklnswu=304  ms  AL Rgmkhnyh=360  ms  P Horizontal Axis=-16  deg  P Front Axis=68  deg  QRSD Interval=83  ms  QT Kfmtyrvh=229  ms  FAbA=630  ms  QRS Axis=57  deg  T Wave Axis=53  deg  - ABNORMAL ECG -  Sinus rhythm  Probable anteroseptal  infarct, old  Date and Time of Study:2024-09-06 07:26:15    ECG 12 Lead Rhythm Change [[id number]] Collected: 09/06/24 0759     Updated: 09/06/24 0800     QT Interval 404 ms      QTC Interval 409 ms     Narrative:      HEART RATE=62  bpm  RR Kesjfohv=581  ms  NE Jqcfdhme=794  ms  P Horizontal Axis=1  deg  P Front Axis=57  deg  QRSD Interval=90  ms  QT Xoolcxzj=196  ms  UBzC=147  ms  QRS Axis=43  deg  T Wave Axis=41  deg  - NORMAL ECG -  Sinus rhythm  Date and Time of Study:2024-09-06 07:59:50    Telemetry Scan [[id number]] Resulted: 09/06/24     Updated: 09/06/24 0856    Telemetry Scan [[id number]] Resulted: 09/06/24     Updated: 09/06/24 1112                    CT Angiogram Chest Pulmonary Embolism    Result Date: 9/6/2024  Impression: 1. No evidence of pulmonary embolus or other acute process within the chest. 2. Minimal bilateral lower lobe and left upper lobe atelectasis. Electronically Signed: Jerome Khoury MD  9/6/2024 9:18 AM EDT  Workstation ID: [id number]    XR Chest 1 View    Result Date: 9/6/2024  Impression: 1.Bibasilar densities which could reflect some atelectasis. Electronically Signed: Danielito Leggett MD  9/6/2024 8:15 AM EDT  Workstation ID: [id number]       Estimated Creatinine Clearance: 59.3 mL/min (by C-G formula based on SCr of 1.07 mg/dL).    Assessment & Plan   Assessment/Plan       Active Hospital Problems    Diagnosis  POA    **Chest pain [R07.9]  Yes      Resolved Hospital Problems   No resolved problems to display.       Chest pain  -Patient was noted to have recurrent chest pain with an episode of bradycardia while in the ED        Lab Results   Component Value Date     TROPONINT 23 (H) 09/06/2024     TROPONINT 23 (H) 09/06/2024     TROPONINT <0.010 06/16/2022   -Lipase: 84  -D-dimer: 1.20  -Lipid panel showed total cholesterol of 171 with an LDL of 91 and HDL of 51  -Chest X-ray: Bilateral densities possibly representing atelectasis  -CT PE protocol showed no evidence of pulmonary embolism or  other acute process with minimal bilateral lower lobe and left upper lobe atelectasis  -EKG: Sinus rhythm at 62 without obvious acute changes or ectopy with a QTc of 409 ms  -In the ED pt given 324 mg aspirin  -Cardiology consulted  -Telemetry  -NPO  Continue aspirin, statin     Hypertension  -Well controlled       BP Readings from Last 1 Encounters:   09/06/24 138/82   - Continue lisinopril  - Monitor while admitted     Hyperlipidemia  -Statin          VTE Prophylaxis - Active VTE Prophylaxis:  Pharmacologic:        Start     Dose Route Frequency Stop    09/06/24 1600  Enoxaparin Sodium (LOVENOX) syringe 40 mg         40 mg SC Daily --                  Select Mechanical VTE Prophylaxis if Desired & Appropriate      CODE STATUS:    Code Status and Medical Interventions: CPR (Attempt to Resuscitate); Full Support   Ordered at: 09/06/24 1053     Code Status (Patient has no pulse and is not breathing):    CPR (Attempt to Resuscitate)     Medical Interventions (Patient has pulse or is breathing):    Full Support       This patient has been examined wearing personal protective equipment.     I discussed the patient's findings and my recommendations with patient and nursing staff.      Signature:Electronically signed by Vinny Menon PA-C, 09/06/24, 12:38 PM EDT.

## 2024-09-06 NOTE — CASE MANAGEMENT/SOCIAL WORK
Discharge Planning Assessment   [place]     Patient Name: Beck Sears  MRN: [id number]  Today's Date: 9/6/2024    Admit Date: 9/6/2024    Plan: Home with spouse Chelle   Discharge Needs Assessment       Row Name 09/06/24 1110       Living Environment    People in Home spouse    Name(s) of People in Home Spouse Chelle Sears    Current Living Arrangements home    Potentially Unsafe Housing Conditions none    In the past 12 months has the electric, gas, oil, or water company threatened to shut off services in your home? No    Primary Care Provided by self    Provides Primary Care For no one    Family Caregiver if Needed spouse    Family Caregiver Names --  Spouse Chelle Sears    Quality of Family Relationships helpful;involved;stressful    Able to Return to Prior Arrangements yes       Resource/Environmental Concerns    Resource/Environmental Concerns none       Transportation Needs    In the past 12 months, has lack of transportation kept you from medical appointments or from getting medications? no    In the past 12 months, has lack of transportation kept you from meetings, work, or from getting things needed for daily living? No       Food Insecurity    Within the past 12 months, you worried that your food would run out before you got the money to buy more. Never true    Within the past 12 months, the food you bought just didn't last and you didn't have money to get more. Never true       Transition Planning    Patient/Family Anticipates Transition to home with family    Patient/Family Anticipated Services at Transition none    Transportation Anticipated family or friend will provide  Spouse Chelle Sears can transport at d/c       Discharge Needs Assessment    Readmission Within the Last 30 Days no previous admission in last 30 days    Equipment Currently Used at Home none    Concerns to be Addressed no discharge needs identified                Discharge Plan       Row Name 09/06/24 1112       Plan     Plan Home with spouse Chelle    Patient/Family in Agreement with Plan yes    Provided Post Acute Provider List? N/A    Provided Post Acute Provider Quality & Resource List? N/A    Plan Comments CM spok to pt. Beck and spouse Chelle at bedside. PCP and pharmacy confirmed. PT. denies financial, transportation, or medication issues. Pt. declined M2B. Pt.'s spouse Chelle can transport home at time of d/c. DC barriers: Cardiology consult              Demographic Summary       Row Name 09/06/24 1103       General Information    Admission Type observation    Arrived From home    Required Notices Provided Observation Status Notice    Referral Source admission list    Reason for Consult discharge planning    Preferred Language English       Contact Information    Permission Granted to Share Info With     Contact Information Obtained for                 Functional Status       Row Name 09/06/24 1110       Functional Status    Current Activity Tolerance excellent       Physical Activity    On average, how many days per week do you engage in moderate to strenuous exercise (like a brisk walk)? 7 days    On average, how many minutes do you engage in exercise at this level? 150+ min    Number of minutes of exercise per week 1050       Functional Status, IADL    Medications independent    Meal Preparation independent    Housekeeping independent    Laundry independent    Shopping independent                     Radha Natarajan RN    Office: [phone]  Fax: [phone]  [email or web address]

## 2024-09-06 NOTE — DISCHARGE SUMMARY
[facility]    Parvin VillatoroBANG    CHIEF COMPLAINT:     Chest pain    HISTORY OF PRESENT ILLNESS:    Obtained from admitting physician HPI on 9/6/2024:  79-year-old male presents for chest pain.  Right-sided.  It radiated down right arm.  Lasted approximately 10 to 15 minutes.  Denies shortness of breath, diaphoresis, nausea and vomiting.  Denies cardiac history.  Non-smoker.  States has had similar symptoms happen once before and per review of notes patient was diagnosed with aspiration pneumonia.    09/06/24 (postobservation admission):  Patient confirms the HPI noted above including being woken from sleep with some right-sided chest pain which radiated down his right arm lasting for more than 10 minutes before resolving spontaneously.  Patient then presented to the ED and was noted to have some bradycardia while he was having his IV started and subsequently developed similar right-sided chest pain as well as diaphoresis.  He denies any dyspnea, palpitations, fever, cough or changes in bowel or bladder habits.  Family history is notable for father who had a CVA.  Patient otherwise is healthy and generally active.  Patient was noted to have some pauses during auscultation of his heart at the time of my exam.  These were infrequent and he denied any associated symptoms            Past Medical History:   Diagnosis Date    Hyperlipidemia 11/14/2019    Hypertension      Past Surgical History:   Procedure Laterality Date    BACK SURGERY      CATARACT EXTRACTION       Family History   Problem Relation Age of Onset    Cancer Mother     Cancer Father     Heart disease Father     Hypertension Father      Social History     Tobacco Use    Smoking status: Never     Passive exposure: Never    Smokeless tobacco: Never   Vaping Use    Vaping status: Never Used   Substance Use Topics    Alcohol use: No    Drug use: No     No medications prior to admission.     Allergies:  Bee venom, Penicillins, and  Sulfa antibiotics    Immunization History   Administered Date(s) Administered    COVID-19 (PFIZER) Purple Cap Monovalent 02/03/2021, 02/24/2021    FLUAD TRI 65YR+ 10/25/2019    Fluad Quad 65+ 10/28/2020    Fluzone  >6mos 10/01/2015, 10/18/2016    Fluzone High-Dose 65+YRS 10/01/2017, 10/29/2018    Fluzone Quad >6mos (Multi-dose) 10/25/2019    Hepatitis A 07/22/2014, 02/10/2015, 05/01/2018, 11/26/2018    Tdap 08/18/2014    Zostavax 01/04/2013           REVIEW OF SYSTEMS:    Review of Systems   Constitutional: Positive for diaphoresis.   HENT: Negative.     Eyes: Negative.    Cardiovascular:  Positive for chest pain and near-syncope.   Respiratory: Negative.     Skin: Negative.    Musculoskeletal: Negative.    Gastrointestinal: Negative.    Genitourinary: Negative.    Neurological: Negative.    Psychiatric/Behavioral: Negative.       Vital Signs  Temp:  [97.5 °F (36.4 °C)-98.3 °F (36.8 °C)] 98.2 °F (36.8 °C)  Heart Rate:  [53-72] 71  Resp:  [15-18] 16  BP: (123-168)/(71-89) 145/86          Physical Exam:  Physical Exam  Vitals reviewed.   Constitutional:       General: He is not in acute distress.     Appearance: Normal appearance. He is normal weight. He is not ill-appearing, toxic-appearing or diaphoretic.   HENT:      Head: Normocephalic.      Right Ear: External ear normal.      Left Ear: External ear normal.      Nose: Nose normal.      Mouth/Throat:      Mouth: Mucous membranes are moist.   Eyes:      Extraocular Movements: Extraocular movements intact.   Cardiovascular:      Rate and Rhythm: Normal rate. Rhythm irregular.      Pulses: Normal pulses.      Heart sounds: Normal heart sounds.   Pulmonary:      Effort: Pulmonary effort is normal.      Breath sounds: Normal breath sounds.   Abdominal:      General: Bowel sounds are normal.      Palpations: Abdomen is soft.      Tenderness: There is no abdominal tenderness.   Musculoskeletal:         General: Normal range of motion.      Cervical back: Normal range of  motion.      Right lower leg: No edema.      Left lower leg: No edema.   Skin:     General: Skin is warm and dry.      Capillary Refill: Capillary refill takes less than 2 seconds.   Neurological:      General: No focal deficit present.      Mental Status: He is alert and oriented to person, place, and time.   Psychiatric:         Mood and Affect: Mood normal.         Behavior: Behavior normal.         Thought Content: Thought content normal.         Judgment: Judgment normal.         Emotional Behavior:   Normal   Debilities:  None  Results Review:    I reviewed the patient's new clinical results.  Lab Results (most recent)       Procedure Component Value Units Date/Time    High Sensitivity Troponin T 2Hr [[id number]]  (Abnormal) Collected: 09/06/24 0935    Specimen: Blood from Arm, Left Updated: 09/06/24 1005     HS Troponin T 23 ng/L      Troponin T Delta 0 ng/L     Narrative:      High Sensitive Troponin T Reference Range:  <14.0 ng/L- Negative Female for AMI  <22.0 ng/L- Negative Male for AMI  >=14 - Abnormal Female indicating possible myocardial injury.  >=22 - Abnormal Male indicating possible myocardial injury.   Clinicians would have to utilize clinical acumen, EKG, Troponin, and serial changes to determine if it is an Acute Myocardial Infarction or myocardial injury due to an underlying chronic condition.         Magnesium [[id number]]  (Normal) Collected: 09/06/24 0745    Specimen: Blood Updated: 09/06/24 0841     Magnesium 2.2 mg/dL     TSH [[id number]]  (Normal) Collected: 09/06/24 0745    Specimen: Blood Updated: 09/06/24 0841     TSH 3.830 uIU/mL     T4, Free [[id number]]  (Normal) Collected: 09/06/24 0745    Specimen: Blood Updated: 09/06/24 0841     Free T4 1.00 ng/dL     Comprehensive Metabolic Panel [[id number]]  (Abnormal) Collected: 09/06/24 0745    Specimen: Blood Updated: 09/06/24 0821     Glucose 104 mg/dL      BUN 19 mg/dL      Creatinine 1.07 mg/dL      Sodium 140 mmol/L      Potassium 4.1  mmol/L      Chloride 105 mmol/L      CO2 26.1 mmol/L      Calcium 9.5 mg/dL      Total Protein 6.9 g/dL      Albumin 4.0 g/dL      ALT (SGPT) 25 U/L      AST (SGOT) 30 U/L      Alkaline Phosphatase 63 U/L      Total Bilirubin 0.5 mg/dL      Globulin 2.9 gm/dL      A/G Ratio 1.4 g/dL      BUN/Creatinine Ratio 17.8     Anion Gap 8.9 mmol/L      eGFR 71.5 mL/min/1.73     Narrative:      GFR Normal >60  Chronic Kidney Disease <60  Kidney Failure <15    The GFR formula is only valid for adults with stable renal function between ages 18 and 70.    High Sensitivity Troponin T [[id number]]  (Abnormal) Collected: 09/06/24 0745    Specimen: Blood Updated: 09/06/24 0821     HS Troponin T 23 ng/L     Narrative:      High Sensitive Troponin T Reference Range:  <14.0 ng/L- Negative Female for AMI  <22.0 ng/L- Negative Male for AMI  >=14 - Abnormal Female indicating possible myocardial injury.  >=22 - Abnormal Male indicating possible myocardial injury.   Clinicians would have to utilize clinical acumen, EKG, Troponin, and serial changes to determine if it is an Acute Myocardial Infarction or myocardial injury due to an underlying chronic condition.         Lipase [[id number]]  (Abnormal) Collected: 09/06/24 0745    Specimen: Blood Updated: 09/06/24 0821     Lipase 84 U/L     D-dimer, Quantitative [[id number]]  (Abnormal) Collected: 09/06/24 0745    Specimen: Blood Updated: 09/06/24 0809     D-Dimer, Quantitative 1.20 mg/L (FEU)     Narrative:      According to the assay 's published package insert, a normal (<0.50 mg/L (FEU)) D-dimer result in conjunction with a non-high clinical probability assessment, excludes deep vein thrombosis (DVT) and pulmonary embolism (PE) with high sensitivity.    D-dimer values increase with age and this can make VTE exclusion of an older population difficult. To address this, the [employer], based on best available evidence and recent guidelines, recommends that  "clinicians use age-adjusted D-dimer thresholds in patients greater than 50 years of age with: a) a low probability of PE who do not meet all Pulmonary Embolism Rule Out Criteria, or b) in those with intermediate probability of PE.   The formula for an age-adjusted D-dimer cut-off is \"age/100\".  For example, a 60 year old patient would have an age-adjusted cut-off of 0.60 mg/L (FEU) and an 80 year old 0.80 mg/L (FEU).    [place] Draw [[id number]] Collected: 09/06/24 0745    Specimen: Blood Updated: 09/06/24 0801    Narrative:      The following orders were created for panel order [place] Draw.  Procedure                               Abnormality         Status                     ---------                               -----------         ------                     Green Top (Gel)[[id number]]                                  Final result               Lavender Top[[id number]]                                     Final result               Gold Top - SST[[id number]]                                   Final result               Light Blue Top[[id number]]                                   Final result                 Please view results for these tests on the individual orders.    Green Top (Gel) [[id number]] Collected: 09/06/24 0745    Specimen: Blood Updated: 09/06/24 0801     Extra Tube Hold for add-ons.     Comment: Auto resulted.       Lavender Top [[id number]] Collected: 09/06/24 0745    Specimen: Blood Updated: 09/06/24 0801     Extra Tube hold for add-on     Comment: Auto resulted       Gold Top - SST [[id number]] Collected: 09/06/24 0745    Specimen: Blood Updated: 09/06/24 0801     Extra Tube Hold for add-ons.     Comment: Auto resulted.       Light Blue Top [[id number]] Collected: 09/06/24 0745    Specimen: Blood Updated: 09/06/24 0801     Extra Tube Hold for add-ons.     Comment: Auto resulted       CBC & Differential [[id number]]  (Abnormal) Collected: 09/06/24 0745    Specimen: Blood Updated: 09/06/24 0759    " Narrative:      The following orders were created for panel order CBC & Differential.  Procedure                               Abnormality         Status                     ---------                               -----------         ------                     CBC Auto Differential[[id number]]        Abnormal            Final result                 Please view results for these tests on the individual orders.    CBC Auto Differential [[id number]]  (Abnormal) Collected: 09/06/24 0745    Specimen: Blood Updated: 09/06/24 0759     WBC 8.20 10*3/mm3      RBC 5.05 10*6/mm3      Hemoglobin 14.1 g/dL      Hematocrit 44.1 %      MCV 87.3 fL      MCH 27.9 pg      MCHC 32.0 g/dL      RDW 15.2 %      RDW-SD 49.0 fl      MPV 8.6 fL      Platelets 256 10*3/mm3      Neutrophil % 46.2 %      Lymphocyte % 36.2 %      Monocyte % 11.3 %      Eosinophil % 4.9 %      Basophil % 1.2 %      Immature Grans % 0.2 %      Neutrophils, Absolute 3.78 10*3/mm3      Lymphocytes, Absolute 2.97 10*3/mm3      Monocytes, Absolute 0.93 10*3/mm3      Eosinophils, Absolute 0.40 10*3/mm3      Basophils, Absolute 0.10 10*3/mm3      Immature Grans, Absolute 0.02 10*3/mm3      nRBC 0.0 /100 WBC             Imaging Results (Most Recent)       Procedure Component Value Units Date/Time    CT Angiogram Chest Pulmonary Embolism [[id number]] Collected: 09/06/24 0914     Updated: 09/06/24 0921    Narrative:      CT ANGIOGRAM CHEST PULMONARY EMBOLISM    Date of Exam: 9/6/2024 8:35 AM EDT    Indication: elevated dimer, R chest pain.    Comparison: 6/16/2022    Technique: Axial CT images were obtained of the chest after the uneventful intravenous administration of iodinated contrast utilizing pulmonary embolism protocol.  Sagittal and coronal reconstructions were performed.  Automated exposure control and   iterative reconstruction methods were used.      Findings:  Mild coronary artery calcification.    Pulmonary arteries are well-opacified. No evidence of pulmonary  embolus. Thoracic aorta is of normal caliber. No evidence of aortic dissection.    No mediastinal, hilar, or axillary adenopathy. No pleural effusions.    There is dependent atelectasis within the bilateral lower lobes. There is left upper lobe atelectasis. There are no suspicious noncalcified pulmonary nodules. Bilateral adrenal glands are within normal limits.    There are multiple well-circumscribed low-density masses within the liver favored to be cyst or hemangiomas.    No lytic or sclerotic bony lesions are seen.      Impression:      Impression:    1. No evidence of pulmonary embolus or other acute process within the chest.  2. Minimal bilateral lower lobe and left upper lobe atelectasis.        Electronically Signed: Jerome Khoury MD    9/6/2024 9:18 AM EDT    Workstation ID: [id number]    XR Chest 1 View [[id number]] Collected: 09/06/24 0813     Updated: 09/06/24 0817    Narrative:      XR CHEST 1 VW    Date of Exam: 9/6/2024 8:03 AM EDT    Indication: R chest pain    Comparison: July 12, 2022    Findings:  The heart not definitely enlarged. There are some bibasilar areas of density which relate to atelectasis. There are no definite pleural effusions. Upper lungs seem clear.      Impression:      Impression:  1.Bibasilar densities which could reflect some atelectasis.      Electronically Signed: Danielito Leggett MD    9/6/2024 8:15 AM EDT    Workstation ID: [id number]          reviewed    ECG/EMG Results (most recent)       Procedure Component Value Units Date/Time    Telemetry Scan [[id number]] Resulted: 09/06/24     Updated: 09/06/24 0856    Telemetry Scan [[id number]] Resulted: 09/06/24     Updated: 09/06/24 1112    ECG 12 Lead Rhythm Change [[id number]] Collected: 09/06/24 0759     Updated: 09/07/24 0600     QT Interval 404 ms      QTC Interval 409 ms     Narrative:      HEART RATE=62  bpm  RR Yldcseev=779  ms  WA Hdxoztxh=196  ms  P Horizontal Axis=1  deg  P Front Axis=57  deg  QRSD Interval=90  ms  QT  Onfieazf=952  ms  VNiK=383  ms  QRS Axis=43  deg  T Wave Axis=41  deg  - NORMAL ECG -  Sinus rhythm  Electronically Signed By: Pascual Foote (LYNN) 2024-09-07 06:00:24  Date and Time of Study:2024-09-06 07:59:50    ECG 12 Lead Chest Pain [[id number]] Collected: 09/06/24 0726     Updated: 09/07/24 0602     QT Interval 395 ms      QTC Interval 411 ms     Narrative:      HEART RATE=65  bpm  RR Zqhjtajj=156  ms  MA Mowyiath=976  ms  P Horizontal Axis=-16  deg  P Front Axis=68  deg  QRSD Interval=83  ms  QT Nfadqmql=586  ms  WWfB=980  ms  QRS Axis=57  deg  T Wave Axis=53  deg  - ABNORMAL ECG -  Sinus rhythm  Probable  anteroseptal infarct, old  When compared with ECG of 16-Jun-2022 04:26:36,  No significant change  Electronically Signed By: Pascual Foote (LYNN) 2024-09-07 06:02:00  Date and Time of Study:2024-09-06 07:26:15          reviewed            Microbiology Results (last 10 days)       ** No results found for the last 240 hours. **            Assessment & Plan     Chest pain        Chest pain  -Patient was noted to have recurrent chest pain with an episode of bradycardia while in the ED  Lab Results   Component Value Date    TROPONINT 23 (H) 09/06/2024    TROPONINT 23 (H) 09/06/2024    TROPONINT <0.010 06/16/2022   -Lipase: 84  -D-dimer: 1.20  -Lipid panel showed total cholesterol of 171 with an LDL of 91 and HDL of 51  -Chest X-ray: Bilateral densities possibly representing atelectasis  -CT PE protocol showed no evidence of pulmonary embolism or other acute process with minimal bilateral lower lobe and left upper lobe atelectasis  -EKG: Sinus rhythm at 62 without obvious acute changes or ectopy with a QTc of 409 ms  -In the ED pt given 324 mg aspirin  -Cardiology consulted  -Telemetry  -NPO  Continue aspirin, statin    Hypertension  -Well controlled   BP Readings from Last 1 Encounters:   09/06/24 145/86   - Continue lisinopril  - Monitor while admitted    Hyperlipidemia  -Statin      I discussed the patients  findings and my recommendations with patient and nursing staff.     Discharge Diagnosis:      Chest pain      Hospital Course  Patient is a 79 y.o. male presented with chest pain with an HPI noted above.  Patient was reported to have recurrence of pain with an episode of bradycardia while in the ED.  Serial troponins were assessed and remained stable at 23 with a lipase of 84, D-dimer elevated at 1.20.  Lipid panel was ordered and showed total cholesterol of 171 with an LDL of 91 and HDL of 51.  Chest x-ray was noted with signs of atelectasis and CT PE protocol was obtained which did not show any evidence of pulmonary embolism or acute process with minimal bilateral lower and left upper lobe atelectasis.  EKG showed sinus rhythm at 62 without obvious acute changes and he was continued on telemetry throughout his admission.  Patient received 324 mg aspirin in the ED. following admission patient was noted to have some ectopy with PVCs as well as PACs noted on the monitor but he remained asymptomatic.  Given his episode of symptomatic bradycardia in the ED cardiology was consulted who recommended stress test which showed normal myocardial perfusion imaging without evidence of ischemia consistent with a low risk study with an EF calculated at greater than 70% low risk for ischemic heart disease.  Patient remained asymptomatic following his admission and was reevaluated by cardiologist who recommended continuing aspirin, statin and other medical therapies.  At this time patient is felt to be in good condition for discharge with close follow-up with her PCP as well as cardiology on an outpatient basis.  Her full testing/results and plan were discussed with patient along with concerning/alarm symptoms for which to call 911/return to the ED.  All questions were answered and she verbalizes her understanding and agreement.    Past Medical History:     Past Medical History:   Diagnosis Date    Hyperlipidemia 11/14/2019     Hypertension        Past Surgical History:     Past Surgical History:   Procedure Laterality Date    BACK SURGERY      CATARACT EXTRACTION         Social History:   Social History     Socioeconomic History    Marital status:    Tobacco Use    Smoking status: Never     Passive exposure: Never    Smokeless tobacco: Never   Vaping Use    Vaping status: Never Used   Substance and Sexual Activity    Alcohol use: No    Drug use: No    Sexual activity: Defer       Procedures Performed         Consults:   Consults       Date and Time Order Name Status Description    9/6/2024 11:36 AM Inpatient Cardiology Consult Completed             Condition on Discharge:     Stable    Discharge Disposition  Home or Self Care    Discharge Medications     Discharge Medications        Continue These Medications        Instructions Start Date   aspirin 81 MG tablet   Take 1 tablet by mouth Daily.      Glucosamine Chondr 500 Complex capsule   Take 1 capsule by mouth Daily.      ibuprofen 600 MG tablet  Commonly known as: ADVIL,MOTRIN   600 mg, Oral, Every 6 Hours PRN      lisinopril 20 MG tablet  Commonly known as: PRINIVIL,ZESTRIL   10 mg, Oral, Daily      Mens Multi Vitamin & Mineral tablet tablet  Generic drug: multivitamin with minerals   Take 1 tablet by mouth Daily.      simvastatin 40 MG tablet  Commonly known as: ZOCOR   40 mg, Oral, Daily      Vitamin D High Potency 25 MCG (1000 UT) capsule  Generic drug: Cholecalciferol   1,000 Units, Oral, Daily      Zinc 50 MG capsule   50 mg, Oral, Daily               Discharge Diet:     Activity at Discharge:     Follow-up Appointments  No future appointments.  Additional Instructions for the Follow-ups that You Need to Schedule       Discharge Follow-up with PCP   As directed       Currently Documented PCP:    Parvin Villatoro APRN    PCP Phone Number:    [phone]     Follow Up Details: 5 to 7 days        Discharge Follow-up with Specified Provider: Cardiology   As directed      To:  Cardiology   Follow Up Details: As instructed                Test Results Pending at Discharge       Risk for Readmission (LACE) Score: 1 (9/6/2024  1:02 PM)      Greater than 30 minutes spent in discharge activities for this patient    Signature:Electronically signed by Vinny Menon PA-C, 09/07/24, 6:42 AM EDT.

## 2024-09-06 NOTE — PLAN OF CARE
Goal Outcome Evaluation:  Plan of Care Reviewed With: patient        Progress: improving  Outcome Evaluation: Pt was not complaining of sharp stabbing chest pain. Pt had a myoview and its results were negative, Alan signed off, patient to d/c.

## 2024-09-07 LAB
QT INTERVAL: 395 MS
QT INTERVAL: 404 MS
QTC INTERVAL: 409 MS
QTC INTERVAL: 411 MS

## 2024-09-07 NOTE — OUTREACH NOTE
Prep Survey      Flowsheet Row Responses   [facility] patient discharged from? [place]   Is LACE score < 7 ? Yes   Eligibility [facility]   Date of Admission 09/06/24   Date of Discharge 09/06/24   Discharge diagnosis Chest pain   Does the patient have one of the following disease processes/diagnoses(primary or secondary)? Other   Prep survey completed? Yes            Jana FLOYD - Registered Nurse

## 2024-09-09 ENCOUNTER — TRANSITIONAL CARE MANAGEMENT TELEPHONE ENCOUNTER (OUTPATIENT)
Dept: CALL CENTER | Facility: HOSPITAL | Age: 79
End: 2024-09-09
Payer: MEDICARE

## 2024-09-09 NOTE — OUTREACH NOTE
Call Center TCM Note      Flowsheet Row Responses   [facility] patient discharged from? [place]   Does the patient have one of the following disease processes/diagnoses(primary or secondary)? Other   TCM attempt successful? Yes   Call start time 0901   Call end time 0904   Discharge diagnosis Chest pain   Person spoke with today (if not patient) and relationship pt   Meds reviewed with patient/caregiver? Yes   Is the patient having any side effects they believe may be caused by any medication additions or changes? No   Does the patient have all medications ordered at discharge? N/A   Is the patient taking all medications as directed (includes completed medication regime)? Yes   Does the patient have an appointment with their PCP within 7-14 days of discharge? Yes  [[facility]]   Psychosocial issues? No   Did the patient receive a copy of their discharge instructions? Yes   Nursing interventions Reviewed instructions with patient   What is the patient's perception of their health status since discharge? Improving   Is the patient/caregiver able to teach back signs and symptoms related to disease process for when to call PCP? Yes   Is the patient/caregiver able to teach back signs and symptoms related to disease process for when to call 911? Yes   Is the patient/caregiver able to teach back the hierarchy of who to call/visit for symptoms/problems? PCP, Specialist, Home health nurse, Urgent Care, ED, 911 Yes   If the patient is a current smoker, are they able to teach back resources for cessation? Not a smoker   TCM call completed? Yes   Wrap up additional comments Pt is out hiking and denies any chest pain/SOB. No medication changes. [facility], and awaiting call from cardiologist for fu appt.   Call end time 0904   Would this patient benefit from a Referral to Amb Social Work? No   Is the patient interested in additional calls from an ambulatory ? No            Angeline Cam  RN    9/9/2024, 09:07 EDT

## 2025-06-19 NOTE — PROGRESS NOTES
"Subjective     Chief Complaint   Patient presents with    Back Pain       HPI: Beck Sears is a 80 y.o. male with hypertension who was referred by their PCP for evaluation of right buttock pain.  Patient's symptoms have been going on for the past 8 months without injury or inciting event.  He points to his right buttock region as the site of his pain.  Pain is worse with activity such as hiking.  It is also aggravated with long periods of sitting.  Notably, sharp as severe localized pain is elicited with piriformis stretches that he is doing at home.  Other stretches do not seem to have the same effect.  Patient has been working with PT without relief.  He also has a small anterior superior labral tear seen on his MRI of his hip but he is already seen orthopedic surgery who does not feel like this is his primary symptom generator.  He has not tried any injections.        Previous Treatment:   NSAID'S, Tylenol, Greater than 6 weeks of physical therapy, Home exercise program, Ice, Heat, and Rest    Previous Injections: none    Previous Neurosurgery: none      PAST MEDICAL HISTORY:    The following portions of the patient's history were reviewed and updated as appropriate: allergies, current medications, past family history, past medical history, past social history, past surgical history, and problem list.      Review of Systems   Constitutional:  Positive for activity change.   HENT: Negative.     Eyes: Negative.    Respiratory: Negative.     Cardiovascular: Negative.    Gastrointestinal: Negative.    Endocrine: Negative.    Genitourinary: Negative.    Musculoskeletal:  Positive for arthralgias, back pain (ache-lower-right hip) and myalgias.   Skin: Negative.    Allergic/Immunologic: Negative.    Neurological:  Positive for numbness (+tingling right hip/lower back).   Hematological: Negative.    Psychiatric/Behavioral: Negative.           Objective     /85   Pulse 74   Resp 18   Ht 172.7 cm (68\")   " Wt 73.5 kg (162 lb)   SpO2 96%   BMI 24.63 kg/m²    Body mass index is 24.63 kg/m².      Physical Exam  Musculoskeletal:        Legs:       Comments: TTP over right piriformis muscle  Positive Freiberg test on the right  Positive Pace sign on the right  Negative SI joint provocative maneuvers   Neurological:      Deep Tendon Reflexes:      Reflex Scores:       Patellar reflexes are 2+ on the right side and 2+ on the left side.       Achilles reflexes are 2+ on the right side and 2+ on the left side.       Neurological Exam    Motor   Normal muscle tone. Strength is 5/5 in all four extremities except as noted.                                             Right                     Left   Iliopsoas                               5                          5   Quadriceps                           5                          5   Gastrocnemius                     5                           5   Anterior tibialis                      5                          5    Sensory  Light touch is normal in upper and lower extremities. Pinprick is normal in upper and lower extremities.     Reflexes  Deep tendon reflexes are 2+ and symmetric except as noted.                                            Right                      Left  Patellar                                2+                         2+  Achilles                                2+                         2+    Gait  Casual gait is normal including stance, stride, and arm swing.            Results Review  I personally reviewed and interpreted the images from the following studies:    MRI right hip  Small tear in the anterior superior labrum.  No significant degenerative joint space arthropathy.    MRI lumbar spine without contrast  Advanced multilevel degenerative changes with significant central and foraminal stenosis, worse on the right, at L2-3 and L3-4.      Assessment & Plan     MDM: Beck Sears is a 80 y.o. male presenting with an 8-month history of right  buttock pain.  Based on his symptoms and the above physical exam I think his symptoms are most consistent with a right-sided piriformis syndrome.  While he does have fairly significant degenerative changes on MRI, these do not correlate with the location and type of symptoms he is reporting today.  He also has a small labral tear on MRI of the hip but has reportedly seen orthopedic surgery and they do not feel this is his primary symptom generator.    Based on my assessment I would recommend revisiting PT with the diagnosis of piriformis syndrome in mind.  He could also try a piriformis injection as there is a fair amount of data showing positive long-term relief with even a single lidocaine injection into the piriformis muscle in these patients.  If he were unable to get significant relief with these modalities then he could also consider an SI joint injection for diagnostic purposes, though I feel this is far less likely.  Patient may follow-up with me as needed if these modalities do not provide significant relief.  Patient is agreeable this plan.       Diagnosis Plan   1. Piriformis syndrome of right side            Return if symptoms worsen or fail to improve.      Beck SMITH Orion  reports that he has never smoked. He has never been exposed to tobacco smoke. He has never used smokeless tobacco.       BMI is within normal parameters. No other follow-up for BMI required.         This patient was examined wearing appropriate personal protective equipment.            Fred Bates PA-C    06/23/25  11:56 EDT      Part of this note may be an electronic transcription/translation of spoken language to printed text using the Dragon Dictation System.

## 2025-06-23 ENCOUNTER — OFFICE VISIT (OUTPATIENT)
Dept: NEUROSURGERY | Facility: CLINIC | Age: 80
End: 2025-06-23
Payer: OTHER GOVERNMENT

## 2025-06-23 VITALS
WEIGHT: 162 LBS | OXYGEN SATURATION: 96 % | HEIGHT: 68 IN | BODY MASS INDEX: 24.55 KG/M2 | DIASTOLIC BLOOD PRESSURE: 85 MMHG | HEART RATE: 74 BPM | RESPIRATION RATE: 18 BRPM | SYSTOLIC BLOOD PRESSURE: 150 MMHG

## 2025-06-23 DIAGNOSIS — G57.01 PIRIFORMIS SYNDROME OF RIGHT SIDE: Primary | ICD-10-CM

## 2025-06-23 PROCEDURE — 99203 OFFICE O/P NEW LOW 30 MIN: CPT

## 2025-06-27 ENCOUNTER — PATIENT ROUNDING (BHMG ONLY) (OUTPATIENT)
Dept: NEUROSURGERY | Facility: CLINIC | Age: 80
End: 2025-06-27
Payer: MEDICARE

## 2025-07-01 ENCOUNTER — TELEPHONE (OUTPATIENT)
Dept: NEUROSURGERY | Facility: CLINIC | Age: 80
End: 2025-07-01
Payer: MEDICARE

## 2025-07-01 DIAGNOSIS — G57.01 PIRIFORMIS SYNDROME OF RIGHT SIDE: Primary | ICD-10-CM

## 2025-07-01 NOTE — TELEPHONE ENCOUNTER
Patient called and asked where his stuff went and I did ask what he was referring to,but upon reading the providers last note he had suggested a couple things such as Physical Therapy and a possible Piriformis injection. The patient stated that that he does not want to do Physical Therapy but he would like to try the Piriformis injection. I told him that we will send this referral to the [facility] and they will reach out to him to schedule the injection. He Verbally understood.

## 2025-07-08 ENCOUNTER — TELEPHONE (OUTPATIENT)
Dept: NEUROSURGERY | Facility: CLINIC | Age: 80
End: 2025-07-08